# Patient Record
Sex: FEMALE | Race: WHITE | NOT HISPANIC OR LATINO | URBAN - METROPOLITAN AREA
[De-identification: names, ages, dates, MRNs, and addresses within clinical notes are randomized per-mention and may not be internally consistent; named-entity substitution may affect disease eponyms.]

---

## 2017-09-10 ENCOUNTER — EMERGENCY (EMERGENCY)
Facility: HOSPITAL | Age: 32
LOS: 0 days | Discharge: HOME | End: 2017-09-10
Attending: EMERGENCY MEDICINE | Admitting: EMERGENCY MEDICINE
Payer: COMMERCIAL

## 2017-09-10 VITALS
HEART RATE: 80 BPM | DIASTOLIC BLOOD PRESSURE: 79 MMHG | SYSTOLIC BLOOD PRESSURE: 119 MMHG | OXYGEN SATURATION: 99 % | TEMPERATURE: 98 F | RESPIRATION RATE: 17 BRPM

## 2017-09-10 VITALS
RESPIRATION RATE: 16 BRPM | WEIGHT: 166.89 LBS | SYSTOLIC BLOOD PRESSURE: 126 MMHG | DIASTOLIC BLOOD PRESSURE: 82 MMHG | OXYGEN SATURATION: 99 % | HEIGHT: 62 IN | HEART RATE: 76 BPM

## 2017-09-10 DIAGNOSIS — Z88.8 ALLERGY STATUS TO OTHER DRUGS, MEDICAMENTS AND BIOLOGICAL SUBSTANCES STATUS: ICD-10-CM

## 2017-09-10 DIAGNOSIS — R42 DIZZINESS AND GIDDINESS: ICD-10-CM

## 2017-09-10 DIAGNOSIS — R11.2 NAUSEA WITH VOMITING, UNSPECIFIED: ICD-10-CM

## 2017-09-10 LAB
ANION GAP SERPL CALC-SCNC: 5 MMOL/L — SIGNIFICANT CHANGE UP (ref 5–17)
BASOPHILS # BLD AUTO: 0.1 K/UL — SIGNIFICANT CHANGE UP (ref 0–0.2)
BASOPHILS NFR BLD AUTO: 0.5 % — SIGNIFICANT CHANGE UP (ref 0–2)
BUN SERPL-MCNC: 14 MG/DL — SIGNIFICANT CHANGE UP (ref 7–23)
CALCIUM SERPL-MCNC: 9.3 MG/DL — SIGNIFICANT CHANGE UP (ref 8.5–10.1)
CHLORIDE SERPL-SCNC: 105 MMOL/L — SIGNIFICANT CHANGE UP (ref 96–108)
CO2 SERPL-SCNC: 24 MMOL/L — SIGNIFICANT CHANGE UP (ref 22–31)
CREAT SERPL-MCNC: 0.82 MG/DL — SIGNIFICANT CHANGE UP (ref 0.5–1.3)
EOSINOPHIL # BLD AUTO: 0 K/UL — SIGNIFICANT CHANGE UP (ref 0–0.5)
EOSINOPHIL NFR BLD AUTO: 0.1 % — SIGNIFICANT CHANGE UP (ref 0–6)
GLUCOSE SERPL-MCNC: 125 MG/DL — HIGH (ref 70–99)
HCT VFR BLD CALC: 43 % — SIGNIFICANT CHANGE UP (ref 34.5–45)
HGB BLD-MCNC: 15.1 G/DL — SIGNIFICANT CHANGE UP (ref 11.5–15.5)
LYMPHOCYTES # BLD AUTO: 1.4 K/UL — SIGNIFICANT CHANGE UP (ref 1–3.3)
LYMPHOCYTES # BLD AUTO: 11.3 % — LOW (ref 13–44)
MCHC RBC-ENTMCNC: 31 PG — SIGNIFICANT CHANGE UP (ref 27–34)
MCHC RBC-ENTMCNC: 35.1 GM/DL — SIGNIFICANT CHANGE UP (ref 32–36)
MCV RBC AUTO: 88.1 FL — SIGNIFICANT CHANGE UP (ref 80–100)
MONOCYTES # BLD AUTO: 0.3 K/UL — SIGNIFICANT CHANGE UP (ref 0–0.9)
MONOCYTES NFR BLD AUTO: 2.8 % — SIGNIFICANT CHANGE UP (ref 2–14)
NEUTROPHILS # BLD AUTO: 10.6 K/UL — HIGH (ref 1.8–7.4)
NEUTROPHILS NFR BLD AUTO: 85.5 % — HIGH (ref 43–77)
PLATELET # BLD AUTO: 318 K/UL — SIGNIFICANT CHANGE UP (ref 150–400)
POTASSIUM SERPL-MCNC: 3.4 MMOL/L — LOW (ref 3.5–5.3)
POTASSIUM SERPL-SCNC: 3.4 MMOL/L — LOW (ref 3.5–5.3)
RBC # BLD: 4.88 M/UL — SIGNIFICANT CHANGE UP (ref 3.8–5.2)
RBC # FLD: 10.5 % — SIGNIFICANT CHANGE UP (ref 10.3–14.5)
SODIUM SERPL-SCNC: 134 MMOL/L — LOW (ref 135–145)
WBC # BLD: 12.4 K/UL — HIGH (ref 3.8–10.5)
WBC # FLD AUTO: 12.4 K/UL — HIGH (ref 3.8–10.5)

## 2017-09-10 PROCEDURE — 70450 CT HEAD/BRAIN W/O DYE: CPT | Mod: 26

## 2017-09-10 PROCEDURE — 99285 EMERGENCY DEPT VISIT HI MDM: CPT

## 2017-09-10 RX ORDER — MECLIZINE HCL 12.5 MG
1 TABLET ORAL
Qty: 15 | Refills: 0 | OUTPATIENT
Start: 2017-09-10 | End: 2017-09-15

## 2017-09-10 RX ORDER — ONDANSETRON 8 MG/1
1 TABLET, FILM COATED ORAL
Qty: 15 | Refills: 0 | OUTPATIENT
Start: 2017-09-10 | End: 2017-09-15

## 2017-09-10 RX ORDER — SODIUM CHLORIDE 9 MG/ML
1000 INJECTION INTRAMUSCULAR; INTRAVENOUS; SUBCUTANEOUS
Qty: 0 | Refills: 0 | Status: DISCONTINUED | OUTPATIENT
Start: 2017-09-10 | End: 2017-09-10

## 2017-09-10 RX ORDER — MECLIZINE HCL 12.5 MG
25 TABLET ORAL ONCE
Qty: 0 | Refills: 0 | Status: COMPLETED | OUTPATIENT
Start: 2017-09-10 | End: 2017-09-10

## 2017-09-10 RX ORDER — ONDANSETRON 8 MG/1
4 TABLET, FILM COATED ORAL ONCE
Qty: 0 | Refills: 0 | Status: COMPLETED | OUTPATIENT
Start: 2017-09-10 | End: 2017-09-10

## 2017-09-10 RX ADMIN — Medication 1 MILLIGRAM(S): at 18:11

## 2017-09-10 RX ADMIN — Medication 104 MILLIGRAM(S): at 16:51

## 2017-09-10 RX ADMIN — SODIUM CHLORIDE 1000 MILLILITER(S): 9 INJECTION INTRAMUSCULAR; INTRAVENOUS; SUBCUTANEOUS at 19:00

## 2017-09-10 RX ADMIN — Medication 25 MILLIGRAM(S): at 16:51

## 2017-09-10 RX ADMIN — SODIUM CHLORIDE 1000 MILLILITER(S): 9 INJECTION INTRAMUSCULAR; INTRAVENOUS; SUBCUTANEOUS at 15:45

## 2017-09-10 RX ADMIN — Medication 25 MILLIGRAM(S): at 18:12

## 2017-09-10 RX ADMIN — Medication 1 MILLIGRAM(S): at 16:51

## 2017-09-10 RX ADMIN — ONDANSETRON 4 MILLIGRAM(S): 8 TABLET, FILM COATED ORAL at 16:51

## 2017-09-10 NOTE — ED PROVIDER NOTE - PROGRESS NOTE DETAILS
pt states symptoms improving but not gone. Labs, CT wnl. MD Brian pt feels much better. wants to be d/c'ed. MD Brian

## 2017-09-10 NOTE — ED ADULT TRIAGE NOTE - CHIEF COMPLAINT QUOTE
PT SENT FROM [facility] AFTER EVAL THERE FOR N/V FROM POSS USE OF APPETITE SUPPRESSANTS. GIVEN 4 MG ZOFRAN SL AT [facility] AND 4MG IV ZOFRAN BY EMS EN ROUTE TO ED.

## 2017-09-10 NOTE — ED ADULT NURSE NOTE - OBJECTIVE STATEMENT
Pt is a 32y female, A & O x 3, VSS, presents to ED w/ nausea, dizziness since last night, pt denies LOC, trauma, chest pain, diarrhea or SOB. pt is taking appetite suppressant for months, however started new medication Linzess 3 days ago, pt states room is spinning when she opens her eyes, pt denies HA, Neuro's intact, PERRL, IV lock placed in left AC by EMS PTA, flushes well + blood return. Bed rails up, will continue to monitor.

## 2017-09-10 NOTE — ED ADULT NURSE NOTE - CHIEF COMPLAINT QUOTE
PT SENT FROM [facility] AFTER EVAL THERE FOR N/V FROM POSS USE OF APPETITE SUPPRESSANTS. GIVEN 4 MG ZOFRAN SL AT [facility] AND 4MG IV ZOFRAN BY EMS EN ROUTE TO ED.

## 2017-09-10 NOTE — ED PROVIDER NOTE - OBJECTIVE STATEMENT
33 y/o female pmhx panic attacks, IBS, appetite suppressant (for months), IUD LMP 2 weeks ago presents to ED anand from urgent care w/ sudden onset this morning 730 am room spinning, n/v. Denies HA, fever, diarrhea, neck pain. Pt has never had these sx before. 2 hours ago pt went to urgent care, PO meds did not work so she was sent to the ER by ambulance.

## 2018-10-22 ENCOUNTER — APPOINTMENT (OUTPATIENT)
Dept: ORTHOPEDIC SURGERY | Facility: CLINIC | Age: 33
End: 2018-10-22
Payer: COMMERCIAL

## 2018-10-22 VITALS
DIASTOLIC BLOOD PRESSURE: 81 MMHG | SYSTOLIC BLOOD PRESSURE: 128 MMHG | HEIGHT: 62 IN | HEART RATE: 88 BPM | WEIGHT: 125 LBS | BODY MASS INDEX: 23 KG/M2

## 2018-10-22 DIAGNOSIS — Z82.61 FAMILY HISTORY OF ARTHRITIS: ICD-10-CM

## 2018-10-22 DIAGNOSIS — Z78.9 OTHER SPECIFIED HEALTH STATUS: ICD-10-CM

## 2018-10-22 DIAGNOSIS — Z80.9 FAMILY HISTORY OF MALIGNANT NEOPLASM, UNSPECIFIED: ICD-10-CM

## 2018-10-22 PROCEDURE — 73030 X-RAY EXAM OF SHOULDER: CPT | Mod: RT

## 2018-10-22 PROCEDURE — 99203 OFFICE O/P NEW LOW 30 MIN: CPT

## 2018-11-29 ENCOUNTER — APPOINTMENT (OUTPATIENT)
Dept: ORTHOPEDIC SURGERY | Facility: CLINIC | Age: 33
End: 2018-11-29
Payer: COMMERCIAL

## 2018-11-29 PROCEDURE — 99214 OFFICE O/P EST MOD 30 MIN: CPT | Mod: 25

## 2018-11-29 PROCEDURE — 20610 DRAIN/INJ JOINT/BURSA W/O US: CPT | Mod: RT

## 2019-02-28 ENCOUNTER — APPOINTMENT (OUTPATIENT)
Dept: ORTHOPEDIC SURGERY | Facility: CLINIC | Age: 34
End: 2019-02-28
Payer: COMMERCIAL

## 2019-02-28 VITALS — DIASTOLIC BLOOD PRESSURE: 71 MMHG | HEART RATE: 91 BPM | SYSTOLIC BLOOD PRESSURE: 118 MMHG

## 2019-02-28 PROCEDURE — 99213 OFFICE O/P EST LOW 20 MIN: CPT

## 2019-02-28 NOTE — DISCUSSION/SUMMARY
[de-identified] : 33-year-old female with right shoulder calcific tendinitis\par \par Trial of physical therapy is also not providing significant relief of symptoms, the patient reports continued pain despite aggressive conservative management including prior cortisone injection. Considering the chronicity of findings at this time, concern is for chronic calcific tendinopathy with refractory symptoms. Recommendation at this time is for MRI imaging for stratification of possible surgical intervention. Discussed potential need for rotator cuff repair if calcium is embedded deep within the rotator cuff tendon at time of surgical debridement.\par \par Patient voiced understanding and is willing to undergo further evaluation.\par \par Followup after MRI for possible surgical debridement.

## 2019-02-28 NOTE — PHYSICAL EXAM
[Normal] : normal [Poor Appearance] : well-appearing [Acute Distress] : not in acute distress [Obese] : not obese [FreeTextEntry2] : Oriented to time, place, person\par Mood: Normal\par Affect: Normal\par \par Right shoulder exam\par \par Inspection: No malalignment, No defects, No atrophy\par Skin: No masses, No lesions\par Neck: Negative Spurlings, full ROM, no pain with ROM\par AROM: FF to 180, abduction to 90, ER to 80, IR to upper thoracic\par Painful arc ROM: + with internal rotation\par Tenderness: No bicipital tenderness, positive tenderness to the greater tuberosity/RTC insertion, no anterior shoulder/lesser tuberosity tenderness\par Strength: 4+/5 ER, 5/5 IR in adduction, 4/5 supraspinatus testing, positive O'Briens test\par AC Joint: No ttp/pain with cross arm testing\par Biceps: Speed Negative, Yergusons Negative\par Impingement test: Positive May, Positive Neer \par Stability: Stable\par Vasc: 2+ radial pulse\par Neuro: AIN, PIN, Ulnar nerve in tact to motor\par Sensation: In tact to light touch throughout\par  [de-identified] : 3 views of the right shoulder were obtained 10.22.18 that show no acute fracture or dislocation. There is no glenohumeral and no AC joint degenerative change seen. Type II acromion. There is no significant malalignment. Calcium deposit within the supraspinatus tendon.

## 2019-02-28 NOTE — HISTORY OF PRESENT ILLNESS
[All Other ROS Normal] : All other review of systems are negative except as noted [Joint Pain] : joint pain [Muscle Aches] : muscle aches [Past Hx] : past medical [Fam Hx] : family [Soc Hx] : social [All] : medication and allergy [FreeTextEntry1] : Right shoulder pain  [FreeTextEntry2] : 33 year old RHD female presents today for follow up of right shoulder calcific tendonitis. Attending PT currently, but is not helpful. Cortisone injection 11/29/18 gave her temporary relief. Rates her pain 3/10 and is taking Motrin for pain. She thinks that she may have initially injured her shoulder going across on the monkey bars in the gym (boot-camp). Denies numbness or tingling. Pain is constant and limiting ADL's.

## 2019-03-02 ENCOUNTER — APPOINTMENT (OUTPATIENT)
Dept: MRI IMAGING | Facility: CLINIC | Age: 34
End: 2019-03-02

## 2019-03-05 ENCOUNTER — OTHER (OUTPATIENT)
Age: 34
End: 2019-03-05

## 2019-03-07 ENCOUNTER — APPOINTMENT (OUTPATIENT)
Dept: MRI IMAGING | Facility: CLINIC | Age: 34
End: 2019-03-07
Payer: COMMERCIAL

## 2019-03-07 ENCOUNTER — OUTPATIENT (OUTPATIENT)
Dept: OUTPATIENT SERVICES | Facility: HOSPITAL | Age: 34
LOS: 1 days | End: 2019-03-07
Payer: COMMERCIAL

## 2019-03-07 DIAGNOSIS — Z00.8 ENCOUNTER FOR OTHER GENERAL EXAMINATION: ICD-10-CM

## 2019-03-07 PROCEDURE — 73221 MRI JOINT UPR EXTREM W/O DYE: CPT

## 2019-03-07 PROCEDURE — 73221 MRI JOINT UPR EXTREM W/O DYE: CPT | Mod: 26,RT

## 2019-03-12 RX ORDER — SIMETHICONE 125 MG
CAPSULE ORAL
Refills: 0 | Status: ACTIVE | COMMUNITY

## 2019-03-13 ENCOUNTER — APPOINTMENT (OUTPATIENT)
Dept: ORTHOPEDIC SURGERY | Facility: CLINIC | Age: 34
End: 2019-03-13

## 2019-04-04 ENCOUNTER — APPOINTMENT (OUTPATIENT)
Dept: ORTHOPEDIC SURGERY | Facility: CLINIC | Age: 34
End: 2019-04-04
Payer: COMMERCIAL

## 2019-04-04 VITALS
SYSTOLIC BLOOD PRESSURE: 104 MMHG | BODY MASS INDEX: 23.55 KG/M2 | WEIGHT: 128 LBS | DIASTOLIC BLOOD PRESSURE: 71 MMHG | HEART RATE: 91 BPM | HEIGHT: 62 IN

## 2019-04-04 DIAGNOSIS — M75.31 CALCIFIC TENDINITIS OF RIGHT SHOULDER: ICD-10-CM

## 2019-04-04 PROCEDURE — 20610 DRAIN/INJ JOINT/BURSA W/O US: CPT | Mod: RT

## 2019-04-04 PROCEDURE — 99214 OFFICE O/P EST MOD 30 MIN: CPT | Mod: 25

## 2019-05-08 ENCOUNTER — RX RENEWAL (OUTPATIENT)
Age: 34
End: 2019-05-08

## 2019-05-28 ENCOUNTER — RX RENEWAL (OUTPATIENT)
Age: 34
End: 2019-05-28

## 2019-06-04 PROBLEM — M75.31 CALCIFIC TENDINITIS OF RIGHT SHOULDER: Status: ACTIVE | Noted: 2018-10-22

## 2019-06-04 NOTE — PROCEDURE
[de-identified] : Injection: Right shoulder (Subacromial).\par Indication: Calcific tendinitis.\par \par A discussion was had with the patient regarding this procedure and all questions were answered. All risks, benefits and alternatives were discussed. These included but were not limited to bleeding, infection, and allergic reaction. Alcohol was used to clean the skin, and betadine was used to sterilize and prep the area in the posterior aspect of the right shoulder. Ethyl chloride spray was then used as a topical anesthetic. A 21-gauge needle was used to inject 4cc of 1% lidocaine and 1cc of 40mg/ml methylprednisolone into the right subacromial space. A sterile bandage was then applied. The patient tolerated the procedure well and there were no complications.

## 2019-06-04 NOTE — PHYSICAL EXAM
[Normal] : normal [Poor Appearance] : well-appearing [Acute Distress] : not in acute distress [FreeTextEntry2] : Oriented to time, place, person\par Mood: Normal\par Affect: Normal\par \par Right shoulder exam\par \par Inspection: No malalignment, No defects, No atrophy\par Skin: No masses, No lesions\par Neck: Negative Spurlings, full ROM, no pain with ROM\par AROM: FF to 180, abduction to 90, ER to 80, IR to upper thoracic\par Painful arc ROM: + with internal rotation\par Tenderness: No bicipital tenderness, positive tenderness to the greater tuberosity/RTC insertion, no anterior shoulder/lesser tuberosity tenderness\par Strength: 4+/5 ER, 5/5 IR in adduction, 4/5 supraspinatus testing, positive O'Briens test\par AC Joint: No ttp/pain with cross arm testing\par Biceps: Speed Negative, Yergusons Negative\par Impingement test: Positive May, Positive Neer \par Stability: Stable\par Vasc: 2+ radial pulse\par Neuro: AIN, PIN, Ulnar nerve in tact to motor\par Sensation: In tact to light touch throughout\par  [Obese] : not obese [de-identified] : 3 views of the right shoulder were obtained 10.22.18 that show no acute fracture or dislocation. There is no glenohumeral and no AC joint degenerative change seen. Type II acromion. There is no significant malalignment. Calcium deposit within the supraspinatus tendon.\par \par MRI right shoulder dated 3.7.19 shows evidence of calcific tendinopathy rotator cuff. Degenerative posterior labral tear.

## 2019-06-04 NOTE — HISTORY OF PRESENT ILLNESS
[All Other ROS Normal] : All other review of systems are negative except as noted [Joint Pain] : joint pain [Muscle Aches] : muscle aches [Past Hx] : past medical [Fam Hx] : family [Soc Hx] : social [All] : medication and allergy [FreeTextEntry1] : Right shoulder pain  [FreeTextEntry2] : 33 year old RHD female presents today for follow up of right shoulder calcific tendonitis. Attending PT currently, but is not helpful. Cortisone injection 11/29/18 gave her temporary relief. Rates her pain 10/10 and is taking Motrin for pain. She thinks that she may have initially injured her shoulder going across on the monkey bars in the gym (boot-camp). Denies numbness or tingling. Pain is constant and limiting ADL's.

## 2019-06-04 NOTE — DISCUSSION/SUMMARY
[de-identified] : 33-year-old female with right shoulder calcific tendinitis\par \par Continued pain to the right shoulder for calcific tendinopathy of the right shoulder as seen on MRI imaging. Discussion was had with the patient regarding MRI findings as well as chronic symptoms that are refractory to conservative management at this time. Patient is unable to undergo surgical intervention currently, and would wait until the end of the summer prior to discussion and further treatment. Additional injection therapy was provided today for therapeutic effect.\par \par Incidental finding of posterior labral/slap pathology within the glenohumeral joint. Discussed this would be evaluated at time of surgery also. However, majority of symptoms are consistent with calcific tendinopathy of the rotator cuff.\par \par Followup 6 months as needed.

## 2019-06-12 ENCOUNTER — RX RENEWAL (OUTPATIENT)
Age: 34
End: 2019-06-12

## 2019-07-03 ENCOUNTER — RX RENEWAL (OUTPATIENT)
Age: 34
End: 2019-07-03

## 2019-07-23 ENCOUNTER — RX RENEWAL (OUTPATIENT)
Age: 34
End: 2019-07-23

## 2019-08-19 ENCOUNTER — RX RENEWAL (OUTPATIENT)
Age: 34
End: 2019-08-19

## 2019-09-12 ENCOUNTER — RX RENEWAL (OUTPATIENT)
Age: 34
End: 2019-09-12

## 2019-10-10 ENCOUNTER — RX RENEWAL (OUTPATIENT)
Age: 34
End: 2019-10-10

## 2019-10-28 ENCOUNTER — RX RENEWAL (OUTPATIENT)
Age: 34
End: 2019-10-28

## 2019-12-09 ENCOUNTER — RX RENEWAL (OUTPATIENT)
Age: 34
End: 2019-12-09

## 2020-03-09 ENCOUNTER — RX RENEWAL (OUTPATIENT)
Age: 35
End: 2020-03-09

## 2020-09-06 ENCOUNTER — TRANSCRIPTION ENCOUNTER (OUTPATIENT)
Age: 35
End: 2020-09-06

## 2020-09-29 ENCOUNTER — RX RENEWAL (OUTPATIENT)
Age: 35
End: 2020-09-29

## 2020-12-28 ENCOUNTER — RX RENEWAL (OUTPATIENT)
Age: 35
End: 2020-12-28

## 2021-03-15 ENCOUNTER — TRANSCRIPTION ENCOUNTER (OUTPATIENT)
Age: 36
End: 2021-03-15

## 2021-03-17 ENCOUNTER — TRANSCRIPTION ENCOUNTER (OUTPATIENT)
Age: 36
End: 2021-03-17

## 2021-03-25 ENCOUNTER — TRANSCRIPTION ENCOUNTER (OUTPATIENT)
Age: 36
End: 2021-03-25

## 2021-07-06 ENCOUNTER — RESULT REVIEW (OUTPATIENT)
Age: 36
End: 2021-07-06

## 2021-09-21 ENCOUNTER — TRANSCRIPTION ENCOUNTER (OUTPATIENT)
Age: 36
End: 2021-09-21

## 2022-10-03 ENCOUNTER — RESULT REVIEW (OUTPATIENT)
Age: 37
End: 2022-10-03

## 2022-10-07 ENCOUNTER — APPOINTMENT (OUTPATIENT)
Dept: ORTHOPEDIC SURGERY | Facility: CLINIC | Age: 37
End: 2022-10-07

## 2022-10-07 PROCEDURE — 99203 OFFICE O/P NEW LOW 30 MIN: CPT

## 2022-10-07 PROCEDURE — 73140 X-RAY EXAM OF FINGER(S): CPT | Mod: LT

## 2022-10-07 NOTE — PHYSICAL EXAM
[de-identified] : R hand: \par Tender volar wrist \par Good finger ROM \par +Tinels \par +Phalens \par +Compression test \par Decreased sensation median nerve distribution\par Tender thumb\par \par L hand: \par Tender volar wrist \par Good finger ROM \par +Tinels \par +Phalens \par +Compression test \par Decreased sensation median nerve distribution\par Tender thumb\par \par Xrays neg

## 2022-10-07 NOTE — HISTORY OF PRESENT ILLNESS
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

## 2022-10-17 ENCOUNTER — APPOINTMENT (OUTPATIENT)
Dept: NEUROLOGY | Facility: CLINIC | Age: 37
End: 2022-10-17

## 2022-10-17 DIAGNOSIS — R20.0 ANESTHESIA OF SKIN: ICD-10-CM

## 2022-10-17 DIAGNOSIS — R20.2 ANESTHESIA OF SKIN: ICD-10-CM

## 2022-10-17 PROCEDURE — 95886 MUSC TEST DONE W/N TEST COMP: CPT

## 2022-10-17 PROCEDURE — 95911 NRV CNDJ TEST 9-10 STUDIES: CPT

## 2022-10-21 ENCOUNTER — APPOINTMENT (OUTPATIENT)
Dept: ORTHOPEDIC SURGERY | Facility: CLINIC | Age: 37
End: 2022-10-21

## 2022-10-21 VITALS — BODY MASS INDEX: 23.55 KG/M2 | WEIGHT: 128 LBS | HEIGHT: 62 IN

## 2022-10-21 DIAGNOSIS — M77.8 OTHER ENTHESOPATHIES, NOT ELSEWHERE CLASSIFIED: ICD-10-CM

## 2022-10-21 PROCEDURE — 99213 OFFICE O/P EST LOW 20 MIN: CPT

## 2022-10-21 NOTE — HISTORY OF PRESENT ILLNESS
[0] : 0 [Tingling] : tingling [de-identified] : EMG shows mild CTS [de-identified] : brace\par mobic [FreeTextEntry1] : hands

## 2022-10-21 NOTE — PHYSICAL EXAM
[de-identified] : R hand: \par Tender volar wrist \par Good finger ROM \par +Tinels \par +Phalens \par +Compression test \par Decreased sensation median nerve distribution\par Tender thumb\par \par L hand: \par Tender volar wrist \par Good finger ROM \par +Tinels \par +Phalens \par +Compression test \par Decreased sensation median nerve distribution\par Tender thumb\par \par

## 2022-12-05 ENCOUNTER — RX RENEWAL (OUTPATIENT)
Age: 37
End: 2022-12-05

## 2023-01-23 ENCOUNTER — RX RENEWAL (OUTPATIENT)
Age: 38
End: 2023-01-23

## 2023-02-05 ENCOUNTER — NON-APPOINTMENT (OUTPATIENT)
Age: 38
End: 2023-02-05

## 2023-06-23 ENCOUNTER — APPOINTMENT (OUTPATIENT)
Dept: ORTHOPEDIC SURGERY | Facility: CLINIC | Age: 38
End: 2023-06-23
Payer: COMMERCIAL

## 2023-06-23 VITALS — HEIGHT: 62 IN | BODY MASS INDEX: 29.08 KG/M2 | WEIGHT: 158 LBS

## 2023-06-23 PROCEDURE — 73140 X-RAY EXAM OF FINGER(S): CPT | Mod: LT

## 2023-06-23 PROCEDURE — 99213 OFFICE O/P EST LOW 20 MIN: CPT

## 2023-06-23 NOTE — DISCUSSION/SUMMARY
[de-identified] : Discussed the nature of the diagnosis and risk and benefits of different modalities of treatment.\par Xrays reviewed. \par MDP and warm compress. \par RTO 3 weeks. \par

## 2023-06-23 NOTE — PHYSICAL EXAM
[1st] : 1st [Left] : left fingers [FreeTextEntry3] : hyperemia and tenderness of the thumb IP joint \par EPL and FPL functioning  [de-identified] : IP joint  [FreeTextEntry9] : calcification dorsal DIP

## 2023-06-23 NOTE — HISTORY OF PRESENT ILLNESS
[Dull/Aching] : dull/aching [Radiating] : radiating [Sharp] : sharp [de-identified] : 37 year old female presenting with LT thumb IP swelling for the past 2 months. Reported pain started 3 days ago. She has been taking ibuprofen which has been helping her pain at rest. No injury/trauma.  [] : no [FreeTextEntry1] : left thumb  [FreeTextEntry3] : 2 days  [FreeTextEntry5] : patient noticed a lump on her thumb for the past month or 2. She states that she has severe pain and swelling in the thumb

## 2023-07-17 ENCOUNTER — APPOINTMENT (OUTPATIENT)
Dept: ORTHOPEDIC SURGERY | Facility: CLINIC | Age: 38
End: 2023-07-17
Payer: COMMERCIAL

## 2023-07-17 VITALS — HEIGHT: 62 IN | WEIGHT: 158 LBS | BODY MASS INDEX: 29.08 KG/M2

## 2023-07-17 DIAGNOSIS — M65.20 CALCIFIC TENDINITIS, UNSPECIFIED SITE: ICD-10-CM

## 2023-07-17 PROCEDURE — 99213 OFFICE O/P EST LOW 20 MIN: CPT

## 2023-07-17 NOTE — DISCUSSION/SUMMARY
[de-identified] : Discussed the nature of the diagnosis and risk and benefits of different modalities of treatment.\par She is improved and expected to continue to imrpove.\par RTO 4 weeks. \par

## 2023-07-17 NOTE — HISTORY OF PRESENT ILLNESS
[1] : 2 [0] : 0 [de-identified] : 37 year old female being followed for LT thumb calcific tendonitis. She completed MDP. She reports improvement. Decreased pain and swelling.  [FreeTextEntry1] : left thumb  [de-identified] : medrol pack

## 2023-08-14 ENCOUNTER — APPOINTMENT (OUTPATIENT)
Dept: ORTHOPEDIC SURGERY | Facility: CLINIC | Age: 38
End: 2023-08-14

## 2023-12-08 ENCOUNTER — APPOINTMENT (OUTPATIENT)
Dept: ORTHOPEDIC SURGERY | Facility: CLINIC | Age: 38
End: 2023-12-08
Payer: COMMERCIAL

## 2023-12-08 VITALS — HEIGHT: 62 IN | BODY MASS INDEX: 29.08 KG/M2 | WEIGHT: 158 LBS

## 2023-12-08 DIAGNOSIS — G56.02 CARPAL TUNNEL SYNDROME, LEFT UPPER LIMB: ICD-10-CM

## 2023-12-08 DIAGNOSIS — G56.01 CARPAL TUNNEL SYNDROME, RIGHT UPPER LIMB: ICD-10-CM

## 2023-12-08 PROCEDURE — 99214 OFFICE O/P EST MOD 30 MIN: CPT | Mod: 57

## 2024-02-05 ENCOUNTER — APPOINTMENT (OUTPATIENT)
Dept: INTERNAL MEDICINE | Facility: CLINIC | Age: 39
End: 2024-02-05
Payer: COMMERCIAL

## 2024-02-05 VITALS
SYSTOLIC BLOOD PRESSURE: 116 MMHG | OXYGEN SATURATION: 98 % | HEIGHT: 62 IN | DIASTOLIC BLOOD PRESSURE: 72 MMHG | WEIGHT: 180 LBS | HEART RATE: 82 BPM | TEMPERATURE: 99.4 F | BODY MASS INDEX: 33.13 KG/M2

## 2024-02-05 DIAGNOSIS — G56.00 CARPAL TUNNEL SYNDROME, UNSPECIFIED UPPER LIMB: ICD-10-CM

## 2024-02-05 DIAGNOSIS — L30.9 DERMATITIS, UNSPECIFIED: ICD-10-CM

## 2024-02-05 PROCEDURE — 99203 OFFICE O/P NEW LOW 30 MIN: CPT

## 2024-02-05 RX ORDER — ONDANSETRON HYDROCHLORIDE 2 MG/ML
4 INJECTION INTRAMUSCULAR; INTRAVENOUS
Refills: 0 | Status: ACTIVE | COMMUNITY

## 2024-02-05 RX ORDER — METHYLPREDNISOLONE 4 MG/1
4 TABLET ORAL
Qty: 1 | Refills: 0 | Status: DISCONTINUED | COMMUNITY
Start: 2023-06-23 | End: 2024-02-05

## 2024-02-05 RX ORDER — HYDROCHLOROTHIAZIDE 12.5 MG/1
TABLET ORAL
Refills: 0 | Status: DISCONTINUED | COMMUNITY
End: 2024-02-05

## 2024-02-05 RX ORDER — DICLOFENAC SODIUM 75 MG/1
75 TABLET, DELAYED RELEASE ORAL
Qty: 180 | Refills: 0 | Status: DISCONTINUED | COMMUNITY
Start: 2019-02-28 | End: 2024-02-05

## 2024-02-05 RX ORDER — MELOXICAM 15 MG/1
15 TABLET ORAL
Qty: 30 | Refills: 0 | Status: DISCONTINUED | COMMUNITY
Start: 2022-10-21 | End: 2024-02-05

## 2024-02-05 RX ORDER — ACETAMINOPHEN 325 MG
TABLET ORAL
Refills: 0 | Status: DISCONTINUED | COMMUNITY
End: 2024-02-05

## 2024-02-05 RX ORDER — ESCITALOPRAM OXALATE 5 MG/1
TABLET, FILM COATED ORAL
Refills: 0 | Status: DISCONTINUED | COMMUNITY
End: 2024-02-05

## 2024-02-05 RX ORDER — MELOXICAM 15 MG/1
15 TABLET ORAL
Qty: 30 | Refills: 2 | Status: DISCONTINUED | COMMUNITY
Start: 2022-10-07 | End: 2024-02-05

## 2024-02-05 RX ORDER — LINACLOTIDE 72 UG/1
CAPSULE, GELATIN COATED ORAL
Refills: 0 | Status: DISCONTINUED | COMMUNITY
End: 2024-02-05

## 2024-02-05 NOTE — HISTORY OF PRESENT ILLNESS
[FreeTextEntry8] : 37 y/o F Pmhx anxiety depression presesnt to establish care, as her prior pcp is not under her insurance  Reports no acute current complain, but is concerned about her elevated BMI and wants to discuss weight loss option. Also has to change psychiatrist due to new insurance and is on Zoloft and PRN  klonipen and wants to know if emergency prescription can be given in interim of establishing care with a new psychiatrist.  He prior psychiatrist: Aleksandr Smith Therapist: [facility] Recent labs done at PCP, patient has already asked for transfer of records  She is in the process of weaning her zoloft , and is using cbd  to help with anxiety  HCM: Pap Flu: Dec/23

## 2024-02-11 NOTE — ED ADULT NURSE NOTE - CAS EDN DISCHARGE INTERVENTIONS
Verbal/bedside report given to YANETH Marquez. Report included SBAR, ED summary, vitals, and lab/diagnostic results.    IV discontinued, cath removed intact

## 2024-02-12 ENCOUNTER — APPOINTMENT (OUTPATIENT)
Dept: DERMATOLOGY | Facility: CLINIC | Age: 39
End: 2024-02-12

## 2024-02-15 ENCOUNTER — APPOINTMENT (OUTPATIENT)
Dept: BARIATRICS | Facility: CLINIC | Age: 39
End: 2024-02-15

## 2024-02-28 ENCOUNTER — NON-APPOINTMENT (OUTPATIENT)
Age: 39
End: 2024-02-28

## 2024-02-29 ENCOUNTER — NON-APPOINTMENT (OUTPATIENT)
Age: 39
End: 2024-02-29

## 2024-02-29 ENCOUNTER — APPOINTMENT (OUTPATIENT)
Dept: BARIATRICS | Facility: CLINIC | Age: 39
End: 2024-02-29
Payer: COMMERCIAL

## 2024-02-29 VITALS
BODY MASS INDEX: 33.68 KG/M2 | WEIGHT: 183 LBS | OXYGEN SATURATION: 99 % | TEMPERATURE: 97 F | SYSTOLIC BLOOD PRESSURE: 118 MMHG | HEART RATE: 72 BPM | DIASTOLIC BLOOD PRESSURE: 72 MMHG | HEIGHT: 62 IN

## 2024-02-29 DIAGNOSIS — Z86.39 PERSONAL HISTORY OF OTHER ENDOCRINE, NUTRITIONAL AND METABOLIC DISEASE: ICD-10-CM

## 2024-02-29 DIAGNOSIS — R63.8 OTHER SYMPTOMS AND SIGNS CONCERNING FOOD AND FLUID INTAKE: ICD-10-CM

## 2024-02-29 DIAGNOSIS — R63.5 ABNORMAL WEIGHT GAIN: ICD-10-CM

## 2024-02-29 DIAGNOSIS — Z82.3 FAMILY HISTORY OF STROKE: ICD-10-CM

## 2024-02-29 DIAGNOSIS — Z87.19 PERSONAL HISTORY OF OTHER DISEASES OF THE DIGESTIVE SYSTEM: ICD-10-CM

## 2024-02-29 DIAGNOSIS — E46 UNSPECIFIED PROTEIN-CALORIE MALNUTRITION: ICD-10-CM

## 2024-02-29 DIAGNOSIS — Z82.49 FAMILY HISTORY OF ISCHEMIC HEART DISEASE AND OTHER DISEASES OF THE CIRCULATORY SYSTEM: ICD-10-CM

## 2024-02-29 DIAGNOSIS — R63.2 POLYPHAGIA: ICD-10-CM

## 2024-02-29 DIAGNOSIS — Z83.3 FAMILY HISTORY OF DIABETES MELLITUS: ICD-10-CM

## 2024-02-29 PROCEDURE — 99204 OFFICE O/P NEW MOD 45 MIN: CPT

## 2024-02-29 PROCEDURE — G2211 COMPLEX E/M VISIT ADD ON: CPT

## 2024-02-29 RX ORDER — HYDROCHLOROTHIAZIDE 25 MG/1
25 TABLET ORAL DAILY
Refills: 0 | Status: ACTIVE | COMMUNITY

## 2024-02-29 RX ORDER — CLONAZEPAM 0.5 MG/1
0.5 TABLET ORAL
Refills: 0 | Status: ACTIVE | COMMUNITY

## 2024-02-29 RX ORDER — SERTRALINE HYDROCHLORIDE 100 MG/1
100 TABLET, FILM COATED ORAL
Refills: 0 | Status: DISCONTINUED | COMMUNITY
End: 2024-02-29

## 2024-03-01 LAB
ALBUMIN SERPL ELPH-MCNC: 4.8 G/DL
ALP BLD-CCNC: 54 U/L
ALT SERPL-CCNC: 24 U/L
ANION GAP SERPL CALC-SCNC: 13 MMOL/L
AST SERPL-CCNC: 22 U/L
BASOPHILS # BLD AUTO: 0.05 K/UL
BASOPHILS NFR BLD AUTO: 0.8 %
BILIRUB SERPL-MCNC: 0.8 MG/DL
BUN SERPL-MCNC: 14 MG/DL
CALCIUM SERPL-MCNC: 9.5 MG/DL
CHLORIDE SERPL-SCNC: 98 MMOL/L
CHOLEST SERPL-MCNC: 288 MG/DL
CO2 SERPL-SCNC: 25 MMOL/L
CREAT SERPL-MCNC: 0.78 MG/DL
EGFR: 100 ML/MIN/1.73M2
EOSINOPHIL # BLD AUTO: 0.14 K/UL
EOSINOPHIL NFR BLD AUTO: 2.3 %
ESTIMATED AVERAGE GLUCOSE: 108 MG/DL
GLUCOSE SERPL-MCNC: 82 MG/DL
HBA1C MFR BLD HPLC: 5.4 %
HCG SERPL-MCNC: <1 MIU/ML
HCT VFR BLD CALC: 40.7 %
HDLC SERPL-MCNC: 74 MG/DL
HGB BLD-MCNC: 14.4 G/DL
IMM GRANULOCYTES NFR BLD AUTO: 0.2 %
INSULIN P FAST SERPL-ACNC: 9.8 UU/ML
LDLC SERPL CALC-MCNC: 202 MG/DL
LYMPHOCYTES # BLD AUTO: 1.73 K/UL
LYMPHOCYTES NFR BLD AUTO: 27.9 %
MAN DIFF?: NORMAL
MCHC RBC-ENTMCNC: 30.4 PG
MCHC RBC-ENTMCNC: 35.4 GM/DL
MCV RBC AUTO: 85.9 FL
MONOCYTES # BLD AUTO: 0.53 K/UL
MONOCYTES NFR BLD AUTO: 8.5 %
NEUTROPHILS # BLD AUTO: 3.75 K/UL
NEUTROPHILS NFR BLD AUTO: 60.3 %
NONHDLC SERPL-MCNC: 215 MG/DL
PLATELET # BLD AUTO: 269 K/UL
POTASSIUM SERPL-SCNC: 3.9 MMOL/L
PROT SERPL-MCNC: 7.5 G/DL
RBC # BLD: 4.74 M/UL
RBC # FLD: 11.8 %
SODIUM SERPL-SCNC: 136 MMOL/L
TRIGL SERPL-MCNC: 77 MG/DL
TSH SERPL-ACNC: 0.94 UIU/ML
WBC # FLD AUTO: 6.21 K/UL

## 2024-03-03 NOTE — PHYSICAL EXAM
[No Acute Distress] : no acute distress [Well Nourished] : well nourished [Well Developed] : well developed [Well-Appearing] : well-appearing [Normal Sclera/Conjunctiva] : normal sclera/conjunctiva [PERRL] : pupils equal round and reactive to light [EOMI] : extraocular movements intact [Normal Outer Ear/Nose] : the outer ears and nose were normal in appearance [Normal Oropharynx] : the oropharynx was normal [No JVD] : no jugular venous distention [No Lymphadenopathy] : no lymphadenopathy [Supple] : supple [Thyroid Normal, No Nodules] : the thyroid was normal and there were no nodules present [No Respiratory Distress] : no respiratory distress  [No Accessory Muscle Use] : no accessory muscle use [Clear to Auscultation] : lungs were clear to auscultation bilaterally [Normal Rate] : normal rate  [Regular Rhythm] : with a regular rhythm [Normal S1, S2] : normal S1 and S2 [No Murmur] : no murmur heard [No Carotid Bruits] : no carotid bruits [No Abdominal Bruit] : a ~M bruit was not heard ~T in the abdomen [No Varicosities] : no varicosities [Pedal Pulses Present] : the pedal pulses are present [No Edema] : there was no peripheral edema [No Palpable Aorta] : no palpable aorta [No Extremity Clubbing/Cyanosis] : no extremity clubbing/cyanosis [Soft] : abdomen soft [Non Tender] : non-tender [Non-distended] : non-distended [No Masses] : no abdominal mass palpated [No HSM] : no HSM [Normal Bowel Sounds] : normal bowel sounds [Normal Posterior Cervical Nodes] : no posterior cervical lymphadenopathy [Normal Anterior Cervical Nodes] : no anterior cervical lymphadenopathy [No CVA Tenderness] : no CVA  tenderness [No Spinal Tenderness] : no spinal tenderness [No Joint Swelling] : no joint swelling [No Rash] : no rash [Grossly Normal Strength/Tone] : grossly normal strength/tone [No Focal Deficits] : no focal deficits [Coordination Grossly Intact] : coordination grossly intact [Normal Gait] : normal gait [Deep Tendon Reflexes (DTR)] : deep tendon reflexes were 2+ and symmetric [Normal Affect] : the affect was normal [Normal Insight/Judgement] : insight and judgment were intact

## 2024-03-04 ENCOUNTER — APPOINTMENT (OUTPATIENT)
Dept: INTERNAL MEDICINE | Facility: CLINIC | Age: 39
End: 2024-03-04
Payer: COMMERCIAL

## 2024-03-04 ENCOUNTER — TRANSCRIPTION ENCOUNTER (OUTPATIENT)
Age: 39
End: 2024-03-04

## 2024-03-04 VITALS
HEIGHT: 62 IN | SYSTOLIC BLOOD PRESSURE: 114 MMHG | WEIGHT: 185 LBS | HEART RATE: 81 BPM | OXYGEN SATURATION: 98 % | DIASTOLIC BLOOD PRESSURE: 72 MMHG | BODY MASS INDEX: 34.04 KG/M2 | TEMPERATURE: 98.1 F

## 2024-03-04 PROCEDURE — 99213 OFFICE O/P EST LOW 20 MIN: CPT

## 2024-03-04 NOTE — HISTORY OF PRESENT ILLNESS
[FreeTextEntry1] : f/u [de-identified] : 37 y/o F Pmhx anxiety depression, familial HLD, obesity present  for f/u  Was referred to [facility] on prior visit had routine labs and has elevated lipid panel LDL>200 She has family history of elevated cholesterol in mother. She is watching her diet and eating very health and exercises 5 days a week but continues to have elevated LDL and TCHOL  She was prescribed zep-bound by weight management and is awaiting authorization

## 2024-03-13 ENCOUNTER — TRANSCRIPTION ENCOUNTER (OUTPATIENT)
Age: 39
End: 2024-03-13

## 2024-03-26 ENCOUNTER — TRANSCRIPTION ENCOUNTER (OUTPATIENT)
Age: 39
End: 2024-03-26

## 2024-03-27 ENCOUNTER — TRANSCRIPTION ENCOUNTER (OUTPATIENT)
Age: 39
End: 2024-03-27

## 2024-04-09 ENCOUNTER — APPOINTMENT (OUTPATIENT)
Dept: DERMATOLOGY | Facility: CLINIC | Age: 39
End: 2024-04-09
Payer: COMMERCIAL

## 2024-04-09 DIAGNOSIS — Z12.83 ENCOUNTER FOR SCREENING FOR MALIGNANT NEOPLASM OF SKIN: ICD-10-CM

## 2024-04-09 DIAGNOSIS — L81.4 OTHER MELANIN HYPERPIGMENTATION: ICD-10-CM

## 2024-04-09 DIAGNOSIS — D22.9 MELANOCYTIC NEVI, UNSPECIFIED: ICD-10-CM

## 2024-04-09 DIAGNOSIS — T14.8XXA OTHER INJURY OF UNSPECIFIED BODY REGION, INITIAL ENCOUNTER: ICD-10-CM

## 2024-04-09 DIAGNOSIS — L20.9 ATOPIC DERMATITIS, UNSPECIFIED: ICD-10-CM

## 2024-04-09 PROCEDURE — 10060 I&D ABSCESS SIMPLE/SINGLE: CPT

## 2024-04-09 PROCEDURE — 99204 OFFICE O/P NEW MOD 45 MIN: CPT | Mod: 25

## 2024-04-09 RX ORDER — TRIAMCINOLONE ACETONIDE 1 MG/G
0.1 CREAM TOPICAL TWICE DAILY
Qty: 1 | Refills: 1 | Status: ACTIVE | COMMUNITY
Start: 2024-04-09 | End: 1900-01-01

## 2024-04-09 NOTE — PHYSICAL EXAM
[FreeTextEntry3] : AAOx3, pleasant, NAD, no visual lymphadenopathy hair, scalp, face, nose, eyelids, ears, lips, oropharynx, neck, chest, abdomen, back, right arm, left arm, nails, and hands examined with all normal findings, pertinent findings include:  multiple benign nevi and lentigines red plaques on forearms and axillae raised papule right forearm

## 2024-04-09 NOTE — HISTORY OF PRESENT ILLNESS
[FreeTextEntry1] : skin check [de-identified] : 38 year old female. skin check. hx of atopic derm. flaring. glass in right forearm from vase falling.

## 2024-04-09 NOTE — ASSESSMENT
[FreeTextEntry1] : 1) skin check- -benign findings as above  2) atopic derm -education -gentle skin care reviewed TAC BID PRN; SED discussed dupixent if unresponsive to topicals; SED  3) foreign body; glass -incision using 11 blade and extractor; risk of incomplete treatment, scarring, and recurrence discussed

## 2024-05-06 ENCOUNTER — APPOINTMENT (OUTPATIENT)
Dept: INTERNAL MEDICINE | Facility: CLINIC | Age: 39
End: 2024-05-06
Payer: COMMERCIAL

## 2024-05-06 VITALS
HEART RATE: 79 BPM | OXYGEN SATURATION: 98 % | SYSTOLIC BLOOD PRESSURE: 124 MMHG | DIASTOLIC BLOOD PRESSURE: 78 MMHG | TEMPERATURE: 98 F | HEIGHT: 62 IN | WEIGHT: 184 LBS | BODY MASS INDEX: 33.86 KG/M2

## 2024-05-06 DIAGNOSIS — E78.5 HYPERLIPIDEMIA, UNSPECIFIED: ICD-10-CM

## 2024-05-06 PROCEDURE — 99213 OFFICE O/P EST LOW 20 MIN: CPT

## 2024-05-06 NOTE — HISTORY OF PRESENT ILLNESS
[FreeTextEntry1] : f/u [de-identified] : 39 y/o F Pmhx anxiety depression, familial HLD, obesity present for f/u  Last seen March/2024 Interval: started on wegovy by weight management, tolerating well has only taken 2 doses  She stopped taking the stating that was prescribed on last visit as she ran out of it  She reports has not been able to establish care with new psychiatrist, she tried to wean of sertraline and actually felt depressed now back on original dose but feels symptoms of anxiety have not been well controlled and may need medication change so wants to be under care of psychiatrist.

## 2024-05-08 ENCOUNTER — RX RENEWAL (OUTPATIENT)
Age: 39
End: 2024-05-08

## 2024-05-08 RX ORDER — ATORVASTATIN CALCIUM 10 MG/1
10 TABLET, FILM COATED ORAL
Qty: 30 | Refills: 0 | Status: ACTIVE | COMMUNITY
Start: 2024-03-04 | End: 1900-01-01

## 2024-05-16 ENCOUNTER — APPOINTMENT (OUTPATIENT)
Dept: BARIATRICS/WEIGHT MGMT | Facility: CLINIC | Age: 39
End: 2024-05-16
Payer: COMMERCIAL

## 2024-05-16 VITALS — HEIGHT: 62 IN | WEIGHT: 185 LBS | BODY MASS INDEX: 34.04 KG/M2

## 2024-05-16 PROCEDURE — 97802 MEDICAL NUTRITION INDIV IN: CPT | Mod: 95

## 2024-05-20 ENCOUNTER — APPOINTMENT (OUTPATIENT)
Dept: BARIATRICS | Facility: CLINIC | Age: 39
End: 2024-05-20
Payer: COMMERCIAL

## 2024-05-20 VITALS
WEIGHT: 184.08 LBS | DIASTOLIC BLOOD PRESSURE: 78 MMHG | TEMPERATURE: 96.4 F | OXYGEN SATURATION: 98 % | HEIGHT: 62 IN | SYSTOLIC BLOOD PRESSURE: 110 MMHG | BODY MASS INDEX: 33.88 KG/M2 | HEART RATE: 94 BPM

## 2024-05-20 PROCEDURE — 99214 OFFICE O/P EST MOD 30 MIN: CPT

## 2024-05-20 NOTE — PHYSICAL EXAM
[Obese, well nourished, in no acute distress] : obese, well nourished, in no acute distress [Normal] : affect appropriate [de-identified] : Equal chest rise, non-labored respirations, no audible wheezing. [de-identified] : soft, NT, ND, no evidence of umbilical hernia or diastasis

## 2024-05-20 NOTE — HISTORY OF PRESENT ILLNESS
[de-identified] : 38 year old F with a longstanding history of obesity presents for a weight-loss medication follow-up after having made nutrition and exercise changes since last visit. 1 pound weight gain since last visit 3 months ago. She was unable to find zepbound until 1 month ago. Reports one day of nausea and reflux on 2nd dose of zepbound, otherwise no abdominal pain, vomiting, constipation, diarrhea, heartburn, or appetite suppression.   Patient trying to eat 3 protein-rich meals/day, but admits she still skips lunch most days. 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 Pt is interested in increasing dosage of medication.   Starting weight at initial consult: 183 Current weight at today's visit: 184   Anti-obesity medication(s): Zepbound Start date: 4/2024 Current dose: 2.5 mg Side-effects from anti-obesity medication(s): 1 episode of nausea and reflux Obesity comorbidities: HLD Comorbidities improved/resolved: no History of bariatric surgery: no   Last nutritionist appointment 5/16/24

## 2024-05-20 NOTE — REVIEW OF SYSTEMS
[Fatigue] : fatigue [Recent Change In Weight] : ~T recent weight change [Negative] : Gastrointestinal [Fever] : no fever [Chills] : no chills [Night Sweats] : no night sweats [Confused] : no confusion [Dizziness] : no dizziness [Fainting] : no fainting [Difficulty Walking] : no difficulty walking [de-identified] : numbness and tingling; h/o Carpal Tunnel (followed by Orthopedist)

## 2024-05-20 NOTE — ASSESSMENT
[FreeTextEntry1] : 38 year old F with a longstanding history of obesity presents for a weight-loss medication follow-up after having made nutrition and exercise changes since last visit. 1 pound weight gain since last visit 3 months ago. She was unable to find zepbound until 1 month ago. Reports one day of nausea and reflux on 2nd dose of zepbound, otherwise no abdominal pain, vomiting, constipation, diarrhea, heartburn, or appetite suppression.  Had a lengthy discussion with the patient regarding nutrition, exercise, weight loss medications.   Patient will work on the following: -Meet with nutritionist and follow up with nutrition classes -Eliminate snacks -Increase zero calorie fluid intake to at least 64 oz/day -Focus on eating 3 well-balanced meals during the daytime with appropriate portion size, avoid skipping meals -Cooking fresh meals rather than take out/processed/ready-made foods -Incorporating exercise; walk 8-10k steps daily -Increase dosage of Zepbound to 5 mg weekly. We also discussed starting Wegovy if she is unable to locate Zepbound. -Last blood work 2/2024, next due 8/2024   Discussed with the pt of the warnings of pregnancy while on Zepbound/Wegovy - instructed pt to avoid planned pregnancy and use of contraception - advised pt to stop immediately if becomes pregnant   Pt verbalized understanding of the above Pt will call office immediately for any side effects. Pt verbalizes understanding and wishes to proceed with medical therapy. Patient educated to call with questions/concerns. All questions answered.   Return to office 1 month   Additional time spent before and after visit reviewing chart.

## 2024-05-20 NOTE — PHYSICAL EXAM
[Obese, well nourished, in no acute distress] : obese, well nourished, in no acute distress [Normal] : supple without JVD, no thyromegaly or masses appreciated [de-identified] : healthy, well-appearing adult [de-identified] : Equal chest rise, non-labored respirations, no audible wheezing. [de-identified] : soft, NT, ND, small umbilical hernia observed; no diastasis; obese [de-identified] : KADE

## 2024-05-20 NOTE — HISTORY OF PRESENT ILLNESS
[de-identified] : 38 year old F with a long-standing h/o obesity, who presents today for initial evaluation for weight management options. No abdominal pain, vomiting, constipation, nor diarrhea; reports acid reflux accompanied by occasional nausea (i.e. 1x/month).   Height: 5'2" Weight: Current 183 lbs; Heaviest 190 lbs; Lowest 110 lbs; Goal 120 lbs Diets/exercise programs tried in the past: Yes  Weight loss medications tried in the past: Phentermine Reason for stopping weight loss medication if applicable: Anxiety with panic attacks   History of bariatric surgery: No Obesity comorbidities: None Comorbidities improved/resolved: N/A Current Anti-obesity medication(s): None Anti-obesity medication(s) side effects: N/A   Personal or family history of: Pancreatitis: No Gallstones: No Kidney stones: No MEN syndrome: No Medullary thyroid cancer: No Anxiety: Yes (on Zoloft and Clonazepam) History of eating disorder: No Currently taking narcotic pain medication(s) or suboxone: No   Plans for future pregnancy: No Form(s) of contraception: IUD  Currently breastfeeding if applicable: No   Current dietary lifestyle: Breakfast: Skip; or, Quest bar or Apple with peanut butter Lunch: Skip or snack Dinner: 7-10 PM; Protein and salad (Italian or Mexican food ~1x/week) Snacks: ~2x/day; During the day: Cheese and crackers, artichoke hearts, olives, or apple and peanut butter; at night: something sweet Drinks: Water (60 oz water/day)   Activity Lifestyle: Sleep: 7-8 hrs Physical activity/exercise: Cardio- 5-6 days/week (e.g. 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

## 2024-05-20 NOTE — ASSESSMENT
[FreeTextEntry1] : 38 year old F with a long-standing h/o obesity, who presents today for initial evaluation for weight management options.  I had a lengthy discussion with the patient regarding nutrition, exercise, weight loss medications, and bariatric surgery.   The patient does not qualify for bariatric surgery and is not interested at this time, Patient is most interested in weight loss medications. Patient understands that importance to make significant dietary and lifestyle changes for long-term success with either surgery or medications.   Health maintenance and behavioral/nutrition counseling for obesity: An additional 30 minutes of the visit was spent counseling the patient regarding need for aggressive weight loss and behavior modification including nutrition and proper eating habits, including which foods to eat and avoid. I had a lengthy discussion with the patient regarding nutrition, exercise, weight loss medications, and bariatric surgery. The patient qualifies for and is most interested in weight loss medications.   I emphasized the importance of making healthier food choices including fresh fruits and vegetables, lean meats and protein sources. I recommended front loading calories, incorporating whole grains, and eliminating fast foods. I also discussed the importance of avoiding fried/fatty foods and foods containing high sugar content including juices/shakes/sodas/desserts. Preprinted protein source information list given to patient.   I also encouraged beginning an exercise program and recommended cardiovascular exercises along with strength training to build lean muscle. I made suggestions on different types of exercises to try.   Patient will work on the following: -Meet with nutritionist -Complete blood work -Making better food choices for meals and snacks -Focus on eating 3 well-balanced meals during the daytime with appropriate portion size (try to avoid skipping   breakfast). Protein list and protein shake hand out given to patient at today's visit. -Cooking fresh meals rather than take out/processed/ready-made foods -We discussed starting Zepbound. Instructions for use and possible side effects reviewed with the patient.   Discussed with the pt of the warnings of pregnancy while on weight medication(s):  - instructed pt to avoid planned pregnancy  - advised pt to stop weight medication(s) immediately if becomes pregnant  - call the office Pt verbalizes understanding of the above   Currently there are no contraindications for the use of ZEPBOUND after reviewing pts medical history and labs including personal or family history of thyroid cancer or MEN2. Possible side effects were discussed with the patient including nausea, reflux, constipation, delayed gastric emptying, or pancreatitis   After reviewing pt's history, pt is best suited for Zepbound and will send prescription once labs are reviewed All risks and benefits have been discussed with the pt Pt verbalizes understanding and wishes to proceed with medical therapy Labs ordered to be done before initiating weight management medication(s)   Return to office 3 weeks after starting anti-obesity medication Follow up with nutritionist and nutrition classes All questions answered   Discussed with Dr. Fuentes   Additional time spent before and after visit reviewing chart

## 2024-05-24 NOTE — ED ADULT NURSE NOTE - GASTROINTESTINAL WDL
Colon cancer Bipolar disorder Abdomen soft, nontender, nondistended, bowel sounds present in all 4 quadrants.

## 2024-06-03 ENCOUNTER — APPOINTMENT (OUTPATIENT)
Dept: INTERNAL MEDICINE | Facility: CLINIC | Age: 39
End: 2024-06-03

## 2024-06-10 ENCOUNTER — APPOINTMENT (OUTPATIENT)
Dept: BARIATRICS | Facility: CLINIC | Age: 39
End: 2024-06-10
Payer: COMMERCIAL

## 2024-06-10 VITALS
HEART RATE: 82 BPM | TEMPERATURE: 97.3 F | WEIGHT: 180.11 LBS | HEIGHT: 62 IN | DIASTOLIC BLOOD PRESSURE: 70 MMHG | BODY MASS INDEX: 33.15 KG/M2 | SYSTOLIC BLOOD PRESSURE: 110 MMHG | OXYGEN SATURATION: 98 %

## 2024-06-10 DIAGNOSIS — E66.9 OBESITY, UNSPECIFIED: ICD-10-CM

## 2024-06-10 PROCEDURE — 99214 OFFICE O/P EST MOD 30 MIN: CPT

## 2024-06-10 RX ORDER — SEMAGLUTIDE 0.5 MG/.5ML
0.5 INJECTION, SOLUTION SUBCUTANEOUS
Qty: 1 | Refills: 0 | Status: DISCONTINUED | COMMUNITY
Start: 2024-05-20 | End: 2024-06-10

## 2024-06-10 RX ORDER — TIRZEPATIDE 2.5 MG/.5ML
2.5 INJECTION, SOLUTION SUBCUTANEOUS
Qty: 1 | Refills: 0 | Status: DISCONTINUED | COMMUNITY
Start: 2024-02-29 | End: 2024-06-10

## 2024-06-10 RX ORDER — TIRZEPATIDE 10 MG/.5ML
10 INJECTION, SOLUTION SUBCUTANEOUS
Qty: 4 | Refills: 0 | Status: ACTIVE | COMMUNITY
Start: 2024-05-20 | End: 1900-01-01

## 2024-06-10 NOTE — PHYSICAL EXAM
Cardiology / Alan  Chief Complaints:  Right cheek numbness with right lower lip swelling    History Of Present Illness  Nicholas is a 89 year old male with past medical history significant for hypertension, hyperlipidemia, atrial fibrillation, aortic stenosis s/p recent TAVR 1 month ago presenting with right cheek numbness with right lower lip swelling.    Cardiology on consult for management of cardiac conditions.    Patient presented to ED after he began to have right perioral and right cheek numbness along with right lower lip swelling that started 30 minutes prior to admission.  Patient reports having lightheadedness this morning prior to onset of numbness. Patient received Benadryl, Pepcid, and prednisone in ED. Neurology placed on consult, CT of head negative for acute infarct. Patient also reports having dizziness that started after his metoprolol dose was increased.     Upon admission, vital signs were blood pressure 192/134, heart rate 85, respiration 12, and SPO2 97%. Patient is negative for COVID-19, RSV, Influenza A and B.     ECG: atrial fibrillation with PVCs and nonspecific t-wave changes in lateral leads.     Telemetry reviewed: atrial fibrillation, PVCs.     High sensitivity troponin neg x1    NTproBNP: N/A    Creatinine: 1.14    Hgb 14.3    CXR:  FINDINGS: There is a left lower lobe infiltrate and small left pleural  effusion.  The right lung is clear.  There is mild cardiomegaly however the  pulmonary vascularity is normal.  A prosthetic cardiac valve is present.  IMPRESSION:   Left lower lobe infiltrate and small left pleural effusion.    CT head:  IMPRESSION:  No acute intracranial abnormality.    Past Medical History  Past Medical History:   Diagnosis Date   • A-fib (CMS/HCC)    • Hypertension           Surgical History  Past Surgical History:   Procedure Laterality Date   • Anomalous pulmonary venous return repair, total              Social History  Social History     Tobacco Use   •  Smoking status: Former Smoker     Types: Cigarettes     Quit date:      Years since quittin.9   • Smokeless tobacco: Never Used   Vaping Use   • Vaping Use: never used   Substance Use Topics   • Alcohol use: Yes     Alcohol/week: 7.0 standard drinks     Types: 7 Glasses of wine per week     Comment: 1 glass of wine with evening meals   • Drug use: Never           Family History  History reviewed. No pertinent family history.     Allergies  ALLERGIES:  Garlic   (food or med), Penicillins, and Sulfa antibiotics    Medications  Medications Prior to Admission   Medication Sig Dispense Refill   • lisinopril (ZESTRIL) 5 MG tablet Take 5 mg by mouth daily.     • metoPROLOL succinate (TOPROL-XL) 25 MG 24 hr tablet Take 50 mg by mouth daily.     • apixaBAN (ELIQUIS) 2.5 MG Tab Take 2.5 mg by mouth in the morning and 2.5 mg in the evening.     • atorvastatin (LIPITOR) 20 MG tablet Take 20 mg by mouth daily.     • Multiple Vitamin (MULTIVITAMIN ADULT PO) Take 1 tablet by mouth daily.     • aspirin (ECOTRIN) 81 MG EC tablet Take 81 mg by mouth daily.     • CRANBERRY PO Take 1 capsule by mouth daily.     • tamsulosin (FLOMAX) 0.4 MG Cap Take 0.4 mg by mouth daily.         Review of Systems  Constitutional: negative unless in noted in HPI above  Eyes:  negative unless in noted in HPI above  Ears,  negative unless in noted in HPI above  Respiratory:  No SOB/URBANO. negative unless in noted in HPI above  Cardiovascular:  No chest pain. + Dizziness negative unless in noted in HPI above  Gastrointestinal:  No n/v/diarrhea. negative unless in noted in HPI above  Genitourinary: No hematuria. negative unless in noted in HPI above  Musculoskeletal: negative unless in noted in HPI above  Neurological: +Right-sided facial numbness unless in noted in HPI above  Behavioral/Psych:  negative unless in noted in HPI above        Last Recorded Vitals  Blood pressure (!) 190/103, pulse 78, temperature 97.9 °F (36.6 °C), resp. rate 15, height  5' 3\" (1.6 m), weight 76.3 kg (168 lb 3.4 oz), SpO2 99 %.    Physical Exam  General appearance - awake, alert, nad  Skin: no rashes, no pallor.  Psych - calm, cooperative, no psychosis  Eyes - conjunctive clear. No eye drainage.   Mouth - mucous membranes moist. No erythema. No oral lesions. Lower lip swelling.   Neck - soft, supple. No thyromegaly. No LAD. No jvd.   Pulmonary - clear to auscultation, breathing non-labored. No w/r/r.  CV -  normal S1, S2, distant. No s3/4. No rub. Non displaced pmi.   GI - bowel sounds present, soft, nontender, nondistended, no hsm.   Neurological - alert, oriented,no focal deficits  Musculoskeletal - perfused and warm, no deformity.  Ext:  No edema             Relevant Results  WBC (K/mcL)   Date Value   12/01/2022 5.7     RBC (mil/mcL)   Date Value   12/01/2022 4.73     HCT (%)   Date Value   12/01/2022 44.5     HGB (g/dL)   Date Value   12/01/2022 14.3     PLT (K/mcL)   Date Value   12/01/2022 144     Sodium (mmol/L)   Date Value   12/01/2022 139     Potassium (mmol/L)   Date Value   12/01/2022 4.2     Chloride (mmol/L)   Date Value   12/01/2022 108     Glucose (mg/dL)   Date Value   12/01/2022 107 (H)     Calcium (mg/dL)   Date Value   12/01/2022 9.4     Carbon Dioxide (mmol/L)   Date Value   12/01/2022 26     BUN (mg/dL)   Date Value   12/01/2022 26 (H)     Creatinine (mg/dL)   Date Value   12/01/2022 1.14      No results found for: BNP     XR CHEST PA OR AP 1 VIEW   Final Result       Left lower lobe infiltrate and small left pleural effusion.         Electronically Signed by: ALESSANDRA DURON M.D.    Signed on: 12/1/2022 11:01 AM          CT HEAD LEVEL 1   Final Result      No acute intracranial abnormality.      Findings were relayed by phone to Dr. Wong at 10:08 AM on 12/01/2022.      Electronically Signed by: ALESSANDRA DURON M.D.    Signed on: 12/1/2022 10:09 AM                        Assessment & Plan     Patient Active Problem List   Diagnosis   • Angioedema of lips        89 year old male with past medical history significant for hypertension, hyperlipidemia, atrial fibrillation, aortic stenosis s/p recent TAVR 1 month ago presenting with right cheek numbness with right lower lip swelling.    Diagnosis:  - Hypertensive urgency  - Atrial fibrillation, chronic  -- Dizziness  - Hyperlipidemia  - Aortic stenosis status post TAVR  - Angioedema  -- Left pleural effusion, small    Plan:    Hypertensive urgency  - SBP elevated to 190s.  - Discontinue ACE inhibitor (on lisinopril at home) given angioedema.  - Restart metoprolol succinate 50 mg daily. Start amlodipine 10 mg p.o. daily. PRN hydralazine 10 mg Q6h for SBP > 160.   -- Echocardiogram pending. (Patient refusing echo as he had one completed recently at [facility])    Atrial fibrillation, chronic  - CHADSVASC score of at least 3, continue on Eliquis 2.5 mg p.o. twice daily  - Heart rate controlled.  Continue on metoprolol succinate 50 mg daily.    Dizziness  -- Telemetry without high-grade AV block or significant arrhythmias.   -- Orthostatic vitals pending.   -- Echocardiogram pending. (Patient refusing echo as he had one completed recently at [facility]).  -- Can consider 7 day event monitor upon discharge.     Code Status    Code Status: Not on file    Case discussed with Dr. Tsering Phillips and plan is pending final approval.     [facility]ren [facility]  Cardiology  Office:  [phone] or [facility]    Patient was seen and examined independently by me.  Agree with above note with my edits.  To do steroid taper, benadryl.  Can see in clinic for bp management.  Off lisinopril.    Tsering Phillips M.D.  Cardiology  [phone] (office- [place])     [Obese, well nourished, in no acute distress] : obese, well nourished, in no acute distress [Normal] : affect appropriate [de-identified] : Equal chest rise, non-labored respirations, no audible wheezing. [de-identified] : soft, NT, ND, no evidence of umbilical hernia or diastasis

## 2024-06-10 NOTE — HISTORY OF PRESENT ILLNESS
[de-identified] : 38 year old F with a longstanding history of obesity presents for a weight-loss medication follow-up after having made nutrition and exercise changes since last visit. 4 pound weight loss since last visit 3 weeks ago. She was unable to locate Zepbound 5 mg and was off of medications for 3 weeks. She has taken one 5 mg dose and reports occasional reflux, well controlled with TUMS. No abdominal pain, vomiting, constipation, diarrhea, nausea.   Patient trying to eat 3 protein-rich meals/day, minimal snacking. 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 Pt is interested in increasing dosage of medication.   Starting weight at initial consult: 183 Current weight at today's visit: 180   Anti-obesity medication(s): Zepbound Start date: 4/2024 (missed 3 doses) Current dose: 5 mg Side-effects from anti-obesity medication(s): mild intermittent nausea Obesity comorbidities: HLD Comorbidities improved/resolved: no History of bariatric surgery: no   Last nutritionist appointment 5/16/24

## 2024-06-10 NOTE — ASSESSMENT
[FreeTextEntry1] : 38 year old F with a longstanding history of obesity presents for a weight-loss medication follow-up after having made nutrition and exercise changes since last visit. 4 pound weight loss since last visit 3 weeks ago.  Had a lengthy discussion with the patient regarding nutrition, exercise, weight loss medications.   Patient will work on the following: -Meet with nutritionist and follow up with nutrition classes -Eliminate snacks -Increase zero calorie fluid intake to at least 64 oz/day -Focus on eating 3 well-balanced meals during the daytime with appropriate portion size, avoid skipping meals -Cooking fresh meals rather than take out/processed/ready-made foods -Incorporating exercise; walk 8-10k steps daily -Increase dosage of Zepbound to 10 mg weekly. Pt instructed to call immediately with any side effects. -Discussed low acidic diet -Last blood work 2/2024, next due 8/2024   Discussed with the pt of the warnings of pregnancy while on Zepbound - instructed pt to avoid planned pregnancy and use of contraception - advised pt to stop immediately if becomes pregnant   Pt verbalized understanding of the above Pt will call office immediately for any side effects. Pt verbalizes understanding and wishes to proceed with medical therapy. Patient educated to call with questions/concerns. All questions answered.   Return to office 1 month   Additional time spent before and after visit reviewing chart.

## 2024-06-24 RX ORDER — CLONAZEPAM 0.5 MG/1
0.5 TABLET ORAL
Qty: 15 | Refills: 0 | Status: ACTIVE | COMMUNITY
Start: 2024-05-06 | End: 1900-01-01

## 2024-06-24 RX ORDER — SERTRALINE HYDROCHLORIDE 100 MG/1
100 TABLET, FILM COATED ORAL DAILY
Qty: 90 | Refills: 0 | Status: ACTIVE | COMMUNITY

## 2024-06-25 RX ORDER — SERTRALINE HYDROCHLORIDE 50 MG/1
50 TABLET, FILM COATED ORAL DAILY
Qty: 90 | Refills: 0 | Status: ACTIVE | COMMUNITY

## 2024-06-27 DIAGNOSIS — F41.9 ANXIETY DISORDER, UNSPECIFIED: ICD-10-CM

## 2024-06-27 DIAGNOSIS — F32.A ANXIETY DISORDER, UNSPECIFIED: ICD-10-CM

## 2024-07-16 ENCOUNTER — APPOINTMENT (OUTPATIENT)
Dept: BARIATRICS | Facility: CLINIC | Age: 39
End: 2024-07-16
Payer: COMMERCIAL

## 2024-07-16 VITALS
DIASTOLIC BLOOD PRESSURE: 72 MMHG | BODY MASS INDEX: 33.13 KG/M2 | WEIGHT: 180 LBS | TEMPERATURE: 97.9 F | HEART RATE: 82 BPM | OXYGEN SATURATION: 98 % | SYSTOLIC BLOOD PRESSURE: 122 MMHG | HEIGHT: 62 IN

## 2024-07-16 DIAGNOSIS — E66.9 OBESITY, UNSPECIFIED: ICD-10-CM

## 2024-07-16 PROCEDURE — 99214 OFFICE O/P EST MOD 30 MIN: CPT

## 2024-07-22 ENCOUNTER — APPOINTMENT (OUTPATIENT)
Dept: INTERNAL MEDICINE | Facility: CLINIC | Age: 39
End: 2024-07-22
Payer: COMMERCIAL

## 2024-07-22 VITALS
SYSTOLIC BLOOD PRESSURE: 110 MMHG | HEIGHT: 62 IN | DIASTOLIC BLOOD PRESSURE: 78 MMHG | TEMPERATURE: 98.8 F | BODY MASS INDEX: 32.2 KG/M2 | OXYGEN SATURATION: 98 % | HEART RATE: 76 BPM | WEIGHT: 175 LBS

## 2024-07-22 DIAGNOSIS — E78.5 HYPERLIPIDEMIA, UNSPECIFIED: ICD-10-CM

## 2024-07-22 DIAGNOSIS — F32.A ANXIETY DISORDER, UNSPECIFIED: ICD-10-CM

## 2024-07-22 DIAGNOSIS — F41.9 ANXIETY DISORDER, UNSPECIFIED: ICD-10-CM

## 2024-07-22 PROCEDURE — G2211 COMPLEX E/M VISIT ADD ON: CPT

## 2024-07-22 PROCEDURE — 99214 OFFICE O/P EST MOD 30 MIN: CPT

## 2024-07-22 NOTE — HISTORY OF PRESENT ILLNESS
[FreeTextEntry1] : Follow-up [de-identified] : 39 y/o F Pmhx anxiety depression, familial HLD, obesity present for f/u  No acute complaints Currently on the Mounjaro 10 mg plan to increase to 15 mg awaiting delivery he has lost 6 pounds Denies any side effects Labs at goal

## 2024-08-28 ENCOUNTER — APPOINTMENT (OUTPATIENT)
Dept: BARIATRICS | Facility: CLINIC | Age: 39
End: 2024-08-28
Payer: COMMERCIAL

## 2024-08-28 VITALS
HEART RATE: 109 BPM | TEMPERATURE: 98.1 F | BODY MASS INDEX: 31.48 KG/M2 | OXYGEN SATURATION: 95 % | DIASTOLIC BLOOD PRESSURE: 74 MMHG | SYSTOLIC BLOOD PRESSURE: 109 MMHG | WEIGHT: 171.08 LBS | HEIGHT: 62 IN

## 2024-08-28 DIAGNOSIS — E66.9 OBESITY, UNSPECIFIED: ICD-10-CM

## 2024-08-28 PROCEDURE — 99214 OFFICE O/P EST MOD 30 MIN: CPT

## 2024-08-28 RX ORDER — SERTRALINE 25 MG/1
25 TABLET, FILM COATED ORAL DAILY
Refills: 0 | Status: ACTIVE | COMMUNITY

## 2024-08-28 NOTE — PHYSICAL EXAM
[Obese, well nourished, in no acute distress] : obese, well nourished, in no acute distress [Normal] : affect appropriate [de-identified] : Equal chest rise, non-labored respirations, no audible wheezing. [de-identified] : soft, NT, ND, no evidence of umbilical hernia or diastasis [de-identified] : raised, erythematous rash at injection site

## 2024-08-28 NOTE — ASSESSMENT
[FreeTextEntry1] : 39-year-old F with a longstanding history of obesity presents for a weight-loss medication follow-up. 9 lb weight loss since last visit 1 month ago; on Zepbound 15 mg/week.   Had a lengthy discussion with the patient regarding nutrition, exercise, weight loss medications.   Patient will work on the following: -Meet with nutritionist and follow up with nutrition classes -Eliminate after dinner snacking (consider drinking water instead; can add fruit like Watermelon, Cucumber, Lemon   to water)  -Encourage 64 oz water/day and an additional 2 cups of water for every cup of caffeinated beverage  -Focus on eating 3 well-balanced meals during the daytime with appropriate portion size -Cooking fresh meals rather than take out/processed/ready-made foods -Try to make vegetables the largest portion of each meal, followed by a lean protein (e.g. lentils, chicken, fish), and   finally a complex carbohydrate if still hungry (e.g. sweet potato, farro, quinoa)  -Try to follow the Anti-Reflux Protocol (e.g. avoid acidogenic food/drink, try to avoid lying down within 3 hours of    eating) -Incorporating 150 min of exercise per week with both Cardio- and strength-training; walk 8-10k steps daily -Continue Zepbound 15 mg weekly. Pt instructed to call immediately with any new or worsening side effects. -Discussed going back down to 10 mg due to injection site reaction. Pt states it is improving and would like to continue trying on current dose. If no improvement, will decrease to 10 mg.  -Last blood work done 7/2024, next labs due 1/2025   Discussed with the pt of the warnings of pregnancy while on Zepbound - instructed pt to avoid planned pregnancy and use of contraception; pt has Mirena IUD - advised pt to stop immediately if becomes pregnant - discussed with the pt that Zepbound can reduce the efficacy of OCPs   Pt verbalized understanding of the above Pt will call office immediately for any side effects. Pt verbalizes understanding and wishes to proceed with medical therapy. Patient educated to call with questions/concerns. All questions answered.   Return to office in 6 weeks   Additional time spent before and after visit reviewing chart.

## 2024-08-28 NOTE — REVIEW OF SYSTEMS
[Patient Intake Form Reviewed] : Patient intake form was reviewed [Negative] : Allergic/Immunologic [Fatigue] : fatigue [Reflux/Heartburn] : reflux/heartburn [Fever] : no fever [Chills] : no chills [Night Sweats] : no night sweats [Abdominal Pain] : no abdominal pain [Vomiting] : no vomiting [Constipation] : no constipation [Diarrhea] : no diarrhea [Hernia] : no hernia

## 2024-08-28 NOTE — PHYSICAL EXAM
[Obese, well nourished, in no acute distress] : obese, well nourished, in no acute distress [Normal] : affect appropriate [de-identified] : Equal chest rise, non-labored respirations, no audible wheezing. [de-identified] : soft, NT, ND, no evidence of umbilical hernia or diastasis [de-identified] : raised, erythematous rash at injection site

## 2024-10-15 ENCOUNTER — APPOINTMENT (OUTPATIENT)
Dept: BARIATRICS | Facility: CLINIC | Age: 39
End: 2024-10-15
Payer: COMMERCIAL

## 2024-10-15 VITALS
BODY MASS INDEX: 30.55 KG/M2 | OXYGEN SATURATION: 98 % | TEMPERATURE: 97.5 F | SYSTOLIC BLOOD PRESSURE: 112 MMHG | DIASTOLIC BLOOD PRESSURE: 72 MMHG | HEART RATE: 86 BPM | HEIGHT: 62 IN | WEIGHT: 166 LBS

## 2024-10-15 DIAGNOSIS — R79.9 ABNORMAL FINDING OF BLOOD CHEMISTRY, UNSPECIFIED: ICD-10-CM

## 2024-10-15 DIAGNOSIS — R63.2 POLYPHAGIA: ICD-10-CM

## 2024-10-15 DIAGNOSIS — R63.5 ABNORMAL WEIGHT GAIN: ICD-10-CM

## 2024-10-15 DIAGNOSIS — Z79.899 OTHER LONG TERM (CURRENT) DRUG THERAPY: ICD-10-CM

## 2024-10-15 DIAGNOSIS — Z01.812 ENCOUNTER FOR PREPROCEDURAL LABORATORY EXAMINATION: ICD-10-CM

## 2024-10-15 DIAGNOSIS — Z86.39 PERSONAL HISTORY OF OTHER ENDOCRINE, NUTRITIONAL AND METABOLIC DISEASE: ICD-10-CM

## 2024-10-15 DIAGNOSIS — E61.8 DEFICIENCY OF OTHER SPECIFIED NUTRIENT ELEMENTS: ICD-10-CM

## 2024-10-15 DIAGNOSIS — E56.9 VITAMIN DEFICIENCY, UNSPECIFIED: ICD-10-CM

## 2024-10-15 DIAGNOSIS — Z32.00 ENCOUNTER FOR PREGNANCY TEST, RESULT UNKNOWN: ICD-10-CM

## 2024-10-15 DIAGNOSIS — E46 UNSPECIFIED PROTEIN-CALORIE MALNUTRITION: ICD-10-CM

## 2024-10-15 DIAGNOSIS — R63.8 OTHER SYMPTOMS AND SIGNS CONCERNING FOOD AND FLUID INTAKE: ICD-10-CM

## 2024-10-15 DIAGNOSIS — E66.9 OBESITY, UNSPECIFIED: ICD-10-CM

## 2024-10-15 DIAGNOSIS — Z00.00 ENCOUNTER FOR GENERAL ADULT MEDICAL EXAMINATION W/OUT ABNORMAL FINDINGS: ICD-10-CM

## 2024-10-15 PROCEDURE — 99214 OFFICE O/P EST MOD 30 MIN: CPT

## 2024-10-15 RX ORDER — COPPER 313.4 MG/1
INTRAUTERINE DEVICE INTRAUTERINE
Refills: 0 | Status: ACTIVE | COMMUNITY

## 2024-10-15 RX ORDER — SERTRALINE HYDROCHLORIDE 100 MG/1
100 TABLET, FILM COATED ORAL DAILY
Refills: 0 | Status: ACTIVE | COMMUNITY

## 2024-10-21 LAB
ALBUMIN SERPL ELPH-MCNC: 4.5 G/DL
ALP BLD-CCNC: 50 U/L
ALT SERPL-CCNC: 9 U/L
ANION GAP SERPL CALC-SCNC: 13 MMOL/L
AST SERPL-CCNC: 14 U/L
BASOPHILS # BLD AUTO: 0.06 K/UL
BASOPHILS NFR BLD AUTO: 1.1 %
BILIRUB SERPL-MCNC: 1 MG/DL
BUN SERPL-MCNC: 20 MG/DL
CALCIUM SERPL-MCNC: 9.6 MG/DL
CHLORIDE SERPL-SCNC: 103 MMOL/L
CHOLEST SERPL-MCNC: 215 MG/DL
CO2 SERPL-SCNC: 23 MMOL/L
CREAT SERPL-MCNC: 0.84 MG/DL
EGFR: 91 ML/MIN/1.73M2
EOSINOPHIL # BLD AUTO: 0.27 K/UL
EOSINOPHIL NFR BLD AUTO: 5.2 %
ESTIMATED AVERAGE GLUCOSE: 103 MG/DL
GLUCOSE SERPL-MCNC: 89 MG/DL
HBA1C MFR BLD HPLC: 5.2 %
HCG SERPL-MCNC: <1 MIU/ML
HCT VFR BLD CALC: 41.4 %
HDLC SERPL-MCNC: 55 MG/DL
HGB BLD-MCNC: 13.9 G/DL
IMM GRANULOCYTES NFR BLD AUTO: 0.2 %
LDLC SERPL CALC-MCNC: 145 MG/DL
LYMPHOCYTES # BLD AUTO: 1.17 K/UL
LYMPHOCYTES NFR BLD AUTO: 22.4 %
MAN DIFF?: NORMAL
MCHC RBC-ENTMCNC: 30.6 PG
MCHC RBC-ENTMCNC: 33.6 GM/DL
MCV RBC AUTO: 91.2 FL
MONOCYTES # BLD AUTO: 0.38 K/UL
MONOCYTES NFR BLD AUTO: 7.3 %
NEUTROPHILS # BLD AUTO: 3.33 K/UL
NEUTROPHILS NFR BLD AUTO: 63.8 %
NONHDLC SERPL-MCNC: 160 MG/DL
PLATELET # BLD AUTO: 257 K/UL
POTASSIUM SERPL-SCNC: 4.6 MMOL/L
PROT SERPL-MCNC: 7 G/DL
RBC # BLD: 4.54 M/UL
RBC # FLD: 12.1 %
SODIUM SERPL-SCNC: 139 MMOL/L
TRIGL SERPL-MCNC: 83 MG/DL
TSH SERPL-ACNC: 0.98 UIU/ML
WBC # FLD AUTO: 5.22 K/UL

## 2024-10-28 ENCOUNTER — APPOINTMENT (OUTPATIENT)
Dept: INTERNAL MEDICINE | Facility: CLINIC | Age: 39
End: 2024-10-28
Payer: COMMERCIAL

## 2024-10-28 VITALS
OXYGEN SATURATION: 99 % | TEMPERATURE: 97.2 F | DIASTOLIC BLOOD PRESSURE: 80 MMHG | HEART RATE: 82 BPM | HEIGHT: 62 IN | BODY MASS INDEX: 29.44 KG/M2 | SYSTOLIC BLOOD PRESSURE: 118 MMHG | WEIGHT: 160 LBS

## 2024-10-28 DIAGNOSIS — F41.9 ANXIETY DISORDER, UNSPECIFIED: ICD-10-CM

## 2024-10-28 DIAGNOSIS — Z23 ENCOUNTER FOR IMMUNIZATION: ICD-10-CM

## 2024-10-28 DIAGNOSIS — E78.5 HYPERLIPIDEMIA, UNSPECIFIED: ICD-10-CM

## 2024-10-28 DIAGNOSIS — F32.A ANXIETY DISORDER, UNSPECIFIED: ICD-10-CM

## 2024-10-28 PROCEDURE — 90656 IIV3 VACC NO PRSV 0.5 ML IM: CPT

## 2024-10-28 PROCEDURE — 99214 OFFICE O/P EST MOD 30 MIN: CPT | Mod: 25

## 2024-10-28 PROCEDURE — G0008: CPT

## 2024-12-09 ENCOUNTER — APPOINTMENT (OUTPATIENT)
Dept: BARIATRICS | Facility: CLINIC | Age: 39
End: 2024-12-09
Payer: COMMERCIAL

## 2024-12-09 VITALS
SYSTOLIC BLOOD PRESSURE: 120 MMHG | HEART RATE: 90 BPM | DIASTOLIC BLOOD PRESSURE: 76 MMHG | TEMPERATURE: 97.3 F | BODY MASS INDEX: 29.05 KG/M2 | WEIGHT: 157.85 LBS | HEIGHT: 62 IN | OXYGEN SATURATION: 96 %

## 2024-12-09 DIAGNOSIS — Z79.899 OTHER LONG TERM (CURRENT) DRUG THERAPY: ICD-10-CM

## 2024-12-09 DIAGNOSIS — E66.3 OVERWEIGHT: ICD-10-CM

## 2024-12-09 PROCEDURE — 99214 OFFICE O/P EST MOD 30 MIN: CPT

## 2024-12-09 RX ORDER — METFORMIN HYDROCHLORIDE 500 MG/1
500 TABLET, COATED ORAL TWICE DAILY
Qty: 60 | Refills: 1 | Status: ACTIVE | COMMUNITY
Start: 2024-12-09 | End: 1900-01-01

## 2024-12-09 RX ORDER — BUPROPION HYDROCHLORIDE 150 MG/1
150 TABLET, FILM COATED, EXTENDED RELEASE ORAL
Refills: 0 | Status: ACTIVE | COMMUNITY

## 2025-02-03 ENCOUNTER — APPOINTMENT (OUTPATIENT)
Dept: BARIATRICS | Facility: CLINIC | Age: 40
End: 2025-02-03
Payer: COMMERCIAL

## 2025-02-03 VITALS
TEMPERATURE: 97.5 F | HEART RATE: 84 BPM | OXYGEN SATURATION: 95 % | DIASTOLIC BLOOD PRESSURE: 80 MMHG | BODY MASS INDEX: 28.52 KG/M2 | SYSTOLIC BLOOD PRESSURE: 121 MMHG | WEIGHT: 154.98 LBS | HEIGHT: 62 IN

## 2025-02-03 DIAGNOSIS — R63.4 ABNORMAL WEIGHT LOSS: ICD-10-CM

## 2025-02-03 DIAGNOSIS — E66.3 OVERWEIGHT: ICD-10-CM

## 2025-02-03 DIAGNOSIS — Z79.899 OTHER LONG TERM (CURRENT) DRUG THERAPY: ICD-10-CM

## 2025-02-03 DIAGNOSIS — G47.33 OBSTRUCTIVE SLEEP APNEA (ADULT) (PEDIATRIC): ICD-10-CM

## 2025-02-03 PROCEDURE — 99214 OFFICE O/P EST MOD 30 MIN: CPT

## 2025-02-03 RX ORDER — LAMOTRIGINE 25 MG/1
25 TABLET ORAL DAILY
Refills: 0 | Status: ACTIVE | COMMUNITY

## 2025-02-11 ENCOUNTER — NON-APPOINTMENT (OUTPATIENT)
Age: 40
End: 2025-02-11

## 2025-02-11 RX ORDER — TIRZEPATIDE 10 MG/.5ML
10 INJECTION, SOLUTION SUBCUTANEOUS
Qty: 12 | Refills: 0 | Status: DISCONTINUED | COMMUNITY
Start: 2025-02-03 | End: 2025-02-11

## 2025-02-11 RX ORDER — TOPIRAMATE 25 MG/1
25 TABLET, FILM COATED ORAL
Qty: 30 | Refills: 1 | Status: ACTIVE | COMMUNITY
Start: 2025-02-11 | End: 1900-01-01

## 2025-02-11 RX ORDER — PHENTERMINE HYDROCHLORIDE 37.5 MG/1
37.5 TABLET ORAL DAILY
Qty: 15 | Refills: 1 | Status: ACTIVE | COMMUNITY
Start: 2025-02-11 | End: 1900-01-01

## 2025-03-06 ENCOUNTER — APPOINTMENT (OUTPATIENT)
Dept: INTERNAL MEDICINE | Facility: CLINIC | Age: 40
End: 2025-03-06
Payer: COMMERCIAL

## 2025-03-06 VITALS
WEIGHT: 156 LBS | BODY MASS INDEX: 28.71 KG/M2 | OXYGEN SATURATION: 97 % | SYSTOLIC BLOOD PRESSURE: 108 MMHG | TEMPERATURE: 97.2 F | HEART RATE: 112 BPM | HEIGHT: 62 IN | DIASTOLIC BLOOD PRESSURE: 84 MMHG

## 2025-03-06 DIAGNOSIS — Z78.9 OTHER SPECIFIED HEALTH STATUS: ICD-10-CM

## 2025-03-06 DIAGNOSIS — F32.A ANXIETY DISORDER, UNSPECIFIED: ICD-10-CM

## 2025-03-06 DIAGNOSIS — G47.33 OBSTRUCTIVE SLEEP APNEA (ADULT) (PEDIATRIC): ICD-10-CM

## 2025-03-06 DIAGNOSIS — Z00.00 ENCOUNTER FOR GENERAL ADULT MEDICAL EXAMINATION W/OUT ABNORMAL FINDINGS: ICD-10-CM

## 2025-03-06 DIAGNOSIS — F41.9 ANXIETY DISORDER, UNSPECIFIED: ICD-10-CM

## 2025-03-06 DIAGNOSIS — E78.5 HYPERLIPIDEMIA, UNSPECIFIED: ICD-10-CM

## 2025-03-06 PROCEDURE — 99395 PREV VISIT EST AGE 18-39: CPT

## 2025-03-06 PROCEDURE — 36415 COLL VENOUS BLD VENIPUNCTURE: CPT

## 2025-03-07 DIAGNOSIS — E55.9 VITAMIN D DEFICIENCY, UNSPECIFIED: ICD-10-CM

## 2025-03-07 LAB
25(OH)D3 SERPL-MCNC: 17.3 NG/ML
ALBUMIN SERPL ELPH-MCNC: 4.7 G/DL
ALP BLD-CCNC: 59 U/L
ALT SERPL-CCNC: 14 U/L
ANION GAP SERPL CALC-SCNC: 14 MMOL/L
AST SERPL-CCNC: 15 U/L
BASOPHILS # BLD AUTO: 0.04 K/UL
BASOPHILS NFR BLD AUTO: 0.7 %
BILIRUB SERPL-MCNC: 0.4 MG/DL
BUN SERPL-MCNC: 18 MG/DL
CALCIUM SERPL-MCNC: 9.3 MG/DL
CHLORIDE SERPL-SCNC: 103 MMOL/L
CHOLEST SERPL-MCNC: 221 MG/DL
CO2 SERPL-SCNC: 20 MMOL/L
CREAT SERPL-MCNC: 0.82 MG/DL
EGFRCR SERPLBLD CKD-EPI 2021: 94 ML/MIN/1.73M2
EOSINOPHIL # BLD AUTO: 0.13 K/UL
EOSINOPHIL NFR BLD AUTO: 2.4 %
ESTIMATED AVERAGE GLUCOSE: 108 MG/DL
GLUCOSE SERPL-MCNC: 90 MG/DL
HBA1C MFR BLD HPLC: 5.4 %
HCT VFR BLD CALC: 41.2 %
HDLC SERPL-MCNC: 51 MG/DL
HGB BLD-MCNC: 14 G/DL
HIV1+2 AB SPEC QL IA.RAPID: NONREACTIVE
IMM GRANULOCYTES NFR BLD AUTO: 0.2 %
LDLC SERPL CALC-MCNC: 158 MG/DL
LYMPHOCYTES # BLD AUTO: 1.23 K/UL
LYMPHOCYTES NFR BLD AUTO: 22.9 %
MAN DIFF?: NORMAL
MCHC RBC-ENTMCNC: 30.8 PG
MCHC RBC-ENTMCNC: 34 G/DL
MCV RBC AUTO: 90.5 FL
MONOCYTES # BLD AUTO: 0.31 K/UL
MONOCYTES NFR BLD AUTO: 5.8 %
NEUTROPHILS # BLD AUTO: 3.65 K/UL
NEUTROPHILS NFR BLD AUTO: 68 %
NONHDLC SERPL-MCNC: 170 MG/DL
PLATELET # BLD AUTO: 302 K/UL
POTASSIUM SERPL-SCNC: 3.8 MMOL/L
PROT SERPL-MCNC: 7.2 G/DL
RBC # BLD: 4.55 M/UL
RBC # FLD: 12 %
SODIUM SERPL-SCNC: 138 MMOL/L
TRIGL SERPL-MCNC: 69 MG/DL
TSH SERPL-ACNC: 0.82 UIU/ML
VIT B12 SERPL-MCNC: 683 PG/ML
WBC # FLD AUTO: 5.37 K/UL

## 2025-03-07 RX ORDER — CHOLECALCIFEROL (VITAMIN D3) 1250 MCG
1.25 MG CAPSULE ORAL
Qty: 9 | Refills: 0 | Status: ACTIVE | COMMUNITY
Start: 2025-03-07 | End: 1900-01-01

## 2025-03-08 LAB
HCV AB SER QL: NONREACTIVE
HCV S/CO RATIO: 0.11 S/CO

## 2025-03-13 ENCOUNTER — APPOINTMENT (OUTPATIENT)
Dept: BARIATRICS | Facility: CLINIC | Age: 40
End: 2025-03-13
Payer: COMMERCIAL

## 2025-03-13 VITALS
HEIGHT: 62 IN | SYSTOLIC BLOOD PRESSURE: 112 MMHG | OXYGEN SATURATION: 98 % | BODY MASS INDEX: 28.64 KG/M2 | WEIGHT: 155.64 LBS | HEART RATE: 125 BPM | TEMPERATURE: 97.3 F | DIASTOLIC BLOOD PRESSURE: 70 MMHG

## 2025-03-13 DIAGNOSIS — E66.3 OVERWEIGHT: ICD-10-CM

## 2025-03-13 PROCEDURE — 99214 OFFICE O/P EST MOD 30 MIN: CPT

## 2025-03-14 RX ORDER — TIRZEPATIDE 7.5 MG/.5ML
7.5 INJECTION, SOLUTION SUBCUTANEOUS
Qty: 4 | Refills: 0 | Status: ACTIVE | COMMUNITY
Start: 2025-03-06

## 2025-04-22 ENCOUNTER — APPOINTMENT (OUTPATIENT)
Dept: BARIATRICS | Facility: CLINIC | Age: 40
End: 2025-04-22
Payer: COMMERCIAL

## 2025-04-22 ENCOUNTER — NON-APPOINTMENT (OUTPATIENT)
Age: 40
End: 2025-04-22

## 2025-04-22 VITALS
OXYGEN SATURATION: 98 % | BODY MASS INDEX: 27.59 KG/M2 | SYSTOLIC BLOOD PRESSURE: 112 MMHG | WEIGHT: 149.91 LBS | DIASTOLIC BLOOD PRESSURE: 86 MMHG | HEIGHT: 62 IN | HEART RATE: 99 BPM | TEMPERATURE: 97.8 F

## 2025-04-22 DIAGNOSIS — Z79.899 OTHER LONG TERM (CURRENT) DRUG THERAPY: ICD-10-CM

## 2025-04-22 DIAGNOSIS — R63.4 ABNORMAL WEIGHT LOSS: ICD-10-CM

## 2025-04-22 DIAGNOSIS — E66.3 OVERWEIGHT: ICD-10-CM

## 2025-04-22 PROCEDURE — G2211 COMPLEX E/M VISIT ADD ON: CPT

## 2025-04-22 PROCEDURE — 99214 OFFICE O/P EST MOD 30 MIN: CPT

## 2025-04-22 RX ORDER — TOPIRAMATE 50 MG/1
50 TABLET, FILM COATED ORAL TWICE DAILY
Qty: 60 | Refills: 2 | Status: ACTIVE | COMMUNITY
Start: 1900-01-01 | End: 1900-01-01

## 2025-04-22 RX ORDER — SERTRALINE HYDROCHLORIDE 25 MG/1
25 TABLET, FILM COATED ORAL
Refills: 0 | Status: ACTIVE | COMMUNITY

## 2025-04-24 LAB — HCG UR QL: NEGATIVE

## 2025-06-29 ENCOUNTER — NON-APPOINTMENT (OUTPATIENT)
Age: 40
End: 2025-06-29

## 2025-07-15 ENCOUNTER — APPOINTMENT (OUTPATIENT)
Dept: BARIATRICS | Facility: CLINIC | Age: 40
End: 2025-07-15
Payer: MEDICAID

## 2025-07-15 VITALS
DIASTOLIC BLOOD PRESSURE: 70 MMHG | WEIGHT: 138.45 LBS | OXYGEN SATURATION: 98 % | TEMPERATURE: 97.9 F | SYSTOLIC BLOOD PRESSURE: 118 MMHG | HEART RATE: 106 BPM | HEIGHT: 62 IN | BODY MASS INDEX: 25.48 KG/M2

## 2025-07-15 PROCEDURE — 99214 OFFICE O/P EST MOD 30 MIN: CPT

## 2025-07-15 PROCEDURE — G2211 COMPLEX E/M VISIT ADD ON: CPT | Mod: NC

## 2025-07-15 RX ORDER — LAMOTRIGINE 100 MG/1
100 TABLET ORAL
Refills: 0 | Status: ACTIVE | COMMUNITY

## 2025-08-18 ENCOUNTER — APPOINTMENT (OUTPATIENT)
Dept: BARIATRICS | Facility: CLINIC | Age: 40
End: 2025-08-18
Payer: MEDICAID

## 2025-08-18 VITALS
WEIGHT: 134.48 LBS | TEMPERATURE: 97.3 F | HEART RATE: 113 BPM | OXYGEN SATURATION: 99 % | SYSTOLIC BLOOD PRESSURE: 118 MMHG | BODY MASS INDEX: 24.75 KG/M2 | DIASTOLIC BLOOD PRESSURE: 78 MMHG | HEIGHT: 62 IN

## 2025-08-18 DIAGNOSIS — Z00.00 ENCOUNTER FOR GENERAL ADULT MEDICAL EXAMINATION W/OUT ABNORMAL FINDINGS: ICD-10-CM

## 2025-08-18 DIAGNOSIS — Z86.39 PERSONAL HISTORY OF OTHER ENDOCRINE, NUTRITIONAL AND METABOLIC DISEASE: ICD-10-CM

## 2025-08-18 DIAGNOSIS — Z79.899 OTHER LONG TERM (CURRENT) DRUG THERAPY: ICD-10-CM

## 2025-08-18 DIAGNOSIS — R63.4 ABNORMAL WEIGHT LOSS: ICD-10-CM

## 2025-08-18 PROCEDURE — 99214 OFFICE O/P EST MOD 30 MIN: CPT

## 2025-08-18 PROCEDURE — G2211 COMPLEX E/M VISIT ADD ON: CPT | Mod: NC

## 2025-08-18 RX ORDER — TOPIRAMATE 25 MG/1
25 TABLET, FILM COATED ORAL
Qty: 90 | Refills: 1 | Status: ACTIVE | COMMUNITY
Start: 2025-08-18 | End: 1900-01-01

## 2025-08-18 RX ORDER — LAMOTRIGINE 25 MG/1
25 TABLET ORAL
Refills: 0 | Status: ACTIVE | COMMUNITY